# Patient Record
Sex: FEMALE | Race: WHITE | NOT HISPANIC OR LATINO | Employment: FULL TIME | URBAN - METROPOLITAN AREA
[De-identification: names, ages, dates, MRNs, and addresses within clinical notes are randomized per-mention and may not be internally consistent; named-entity substitution may affect disease eponyms.]

---

## 2024-08-26 ENCOUNTER — TRANSCRIBE ORDERS (OUTPATIENT)
Dept: LAB | Facility: HOSPITAL | Age: 19
End: 2024-08-26
Payer: COMMERCIAL

## 2024-08-26 ENCOUNTER — LAB (OUTPATIENT)
Dept: LAB | Facility: HOSPITAL | Age: 19
End: 2024-08-26
Payer: COMMERCIAL

## 2024-08-26 DIAGNOSIS — Z34.82 PRENATAL CARE, SUBSEQUENT PREGNANCY, SECOND TRIMESTER: ICD-10-CM

## 2024-08-26 DIAGNOSIS — Z34.82 PRENATAL CARE, SUBSEQUENT PREGNANCY, SECOND TRIMESTER: Primary | ICD-10-CM

## 2024-08-26 LAB
ABO GROUP BLD: NORMAL
AMPHET+METHAMPHET UR QL: NEGATIVE
AMPHETAMINES UR QL: NEGATIVE
BACTERIA UR QL AUTO: NORMAL /HPF
BARBITURATES UR QL SCN: NEGATIVE
BENZODIAZ UR QL SCN: NEGATIVE
BILIRUB UR QL STRIP: NEGATIVE
BILIRUB UR QL STRIP: NEGATIVE
BLD GP AB SCN SERPL QL: NEGATIVE
BUPRENORPHINE SERPL-MCNC: NEGATIVE NG/ML
CANNABINOIDS SERPL QL: NEGATIVE
CLARITY UR: CLEAR
CLARITY UR: CLEAR
COCAINE UR QL: NEGATIVE
COLOR UR: YELLOW
COLOR UR: YELLOW
DEPRECATED RDW RBC AUTO: 41 FL (ref 37–54)
ERYTHROCYTE [DISTWIDTH] IN BLOOD BY AUTOMATED COUNT: 13.7 % (ref 12.3–15.4)
FENTANYL UR-MCNC: NEGATIVE NG/ML
GLUCOSE UR STRIP-MCNC: NEGATIVE MG/DL
GLUCOSE UR STRIP-MCNC: NEGATIVE MG/DL
HBV SURFACE AG SERPL QL IA: NORMAL
HCT VFR BLD AUTO: 32.6 % (ref 34–46.6)
HCV AB SER QL: NORMAL
HGB BLD-MCNC: 10.3 G/DL (ref 12–15.9)
HGB UR QL STRIP.AUTO: NEGATIVE
HGB UR QL STRIP.AUTO: NEGATIVE
HIV 1+2 AB+HIV1 P24 AG SERPL QL IA: NORMAL
HOLD SPECIMEN: NORMAL
HYALINE CASTS UR QL AUTO: NORMAL /LPF
KETONES UR QL STRIP: ABNORMAL
KETONES UR QL STRIP: ABNORMAL
LEUKOCYTE ESTERASE UR QL STRIP.AUTO: NEGATIVE
LEUKOCYTE ESTERASE UR QL STRIP.AUTO: NEGATIVE
MCH RBC QN AUTO: 26.3 PG (ref 26.6–33)
MCHC RBC AUTO-ENTMCNC: 31.6 G/DL (ref 31.5–35.7)
MCV RBC AUTO: 83.4 FL (ref 79–97)
METHADONE UR QL SCN: NEGATIVE
NITRITE UR QL STRIP: NEGATIVE
NITRITE UR QL STRIP: NEGATIVE
OPIATES UR QL: NEGATIVE
OXYCODONE UR QL SCN: NEGATIVE
PCP UR QL SCN: NEGATIVE
PH UR STRIP.AUTO: 6.5 [PH] (ref 5–8)
PH UR STRIP.AUTO: 6.5 [PH] (ref 5–8)
PLATELET # BLD AUTO: 256 10*3/MM3 (ref 140–450)
PMV BLD AUTO: 11.2 FL (ref 6–12)
PROT UR QL STRIP: NEGATIVE
PROT UR QL STRIP: NEGATIVE
RBC # BLD AUTO: 3.91 10*6/MM3 (ref 3.77–5.28)
RBC # UR STRIP: NORMAL /HPF
REF LAB TEST METHOD: NORMAL
RH BLD: POSITIVE
SP GR UR STRIP: 1.01 (ref 1–1.03)
SP GR UR STRIP: 1.01 (ref 1–1.03)
SQUAMOUS #/AREA URNS HPF: NORMAL /HPF
TREPONEMA PALLIDUM IGG+IGM AB [PRESENCE] IN SERUM OR PLASMA BY IMMUNOASSAY: NORMAL
TRICYCLICS UR QL SCN: NEGATIVE
UROBILINOGEN UR QL STRIP: ABNORMAL
UROBILINOGEN UR QL STRIP: ABNORMAL
WBC # UR STRIP: NORMAL /HPF
WBC NRBC COR # BLD AUTO: 10.2 10*3/MM3 (ref 3.4–10.8)

## 2024-08-26 PROCEDURE — 85027 COMPLETE CBC AUTOMATED: CPT

## 2024-08-26 PROCEDURE — 80307 DRUG TEST PRSMV CHEM ANLYZR: CPT

## 2024-08-26 PROCEDURE — 87591 N.GONORRHOEAE DNA AMP PROB: CPT

## 2024-08-26 PROCEDURE — 86787 VARICELLA-ZOSTER ANTIBODY: CPT

## 2024-08-26 PROCEDURE — 87086 URINE CULTURE/COLONY COUNT: CPT

## 2024-08-26 PROCEDURE — 86901 BLOOD TYPING SEROLOGIC RH(D): CPT

## 2024-08-26 PROCEDURE — 87491 CHLMYD TRACH DNA AMP PROBE: CPT

## 2024-08-26 PROCEDURE — 87340 HEPATITIS B SURFACE AG IA: CPT

## 2024-08-26 PROCEDURE — 86762 RUBELLA ANTIBODY: CPT

## 2024-08-26 PROCEDURE — G0432 EIA HIV-1/HIV-2 SCREEN: HCPCS

## 2024-08-26 PROCEDURE — 36415 COLL VENOUS BLD VENIPUNCTURE: CPT

## 2024-08-26 PROCEDURE — 86900 BLOOD TYPING SEROLOGIC ABO: CPT

## 2024-08-26 PROCEDURE — 81001 URINALYSIS AUTO W/SCOPE: CPT

## 2024-08-26 PROCEDURE — 86850 RBC ANTIBODY SCREEN: CPT

## 2024-08-26 PROCEDURE — 86780 TREPONEMA PALLIDUM: CPT

## 2024-08-26 PROCEDURE — 86803 HEPATITIS C AB TEST: CPT

## 2024-08-27 LAB
BACTERIA SPEC AEROBE CULT: NO GROWTH
RUBV IGG SERPL IA-ACNC: 2.04 INDEX
VZV IGG SER IA-ACNC: <135 INDEX

## 2024-09-01 LAB
C TRACH RRNA SPEC QL NAA+PROBE: NEGATIVE
N GONORRHOEA RRNA SPEC QL NAA+PROBE: NEGATIVE

## 2024-11-18 ENCOUNTER — LAB (OUTPATIENT)
Dept: LAB | Facility: HOSPITAL | Age: 19
End: 2024-11-18
Payer: COMMERCIAL

## 2024-11-18 ENCOUNTER — TRANSCRIBE ORDERS (OUTPATIENT)
Dept: LAB | Facility: HOSPITAL | Age: 19
End: 2024-11-18
Payer: COMMERCIAL

## 2024-11-18 DIAGNOSIS — Z34.82 PRENATAL CARE, SUBSEQUENT PREGNANCY, SECOND TRIMESTER: Primary | ICD-10-CM

## 2024-11-18 DIAGNOSIS — Z34.82 PRENATAL CARE, SUBSEQUENT PREGNANCY, SECOND TRIMESTER: ICD-10-CM

## 2024-11-18 LAB
BLD GP AB SCN SERPL QL: NEGATIVE
DEPRECATED RDW RBC AUTO: 41.7 FL (ref 37–54)
ERYTHROCYTE [DISTWIDTH] IN BLOOD BY AUTOMATED COUNT: 13.9 % (ref 12.3–15.4)
GLUCOSE 1H P 100 G GLC PO SERPL-MCNC: 120 MG/DL (ref 65–139)
HCT VFR BLD AUTO: 29 % (ref 34–46.6)
HGB BLD-MCNC: 9.2 G/DL (ref 12–15.9)
MCH RBC QN AUTO: 26.6 PG (ref 26.6–33)
MCHC RBC AUTO-ENTMCNC: 31.7 G/DL (ref 31.5–35.7)
MCV RBC AUTO: 83.8 FL (ref 79–97)
PLATELET # BLD AUTO: 212 10*3/MM3 (ref 140–450)
PMV BLD AUTO: 11.3 FL (ref 6–12)
RBC # BLD AUTO: 3.46 10*6/MM3 (ref 3.77–5.28)
TREPONEMA PALLIDUM IGG+IGM AB [PRESENCE] IN SERUM OR PLASMA BY IMMUNOASSAY: NORMAL
WBC NRBC COR # BLD AUTO: 7.82 10*3/MM3 (ref 3.4–10.8)

## 2024-11-18 PROCEDURE — 36415 COLL VENOUS BLD VENIPUNCTURE: CPT

## 2024-11-18 PROCEDURE — 82948 REAGENT STRIP/BLOOD GLUCOSE: CPT | Performed by: STUDENT IN AN ORGANIZED HEALTH CARE EDUCATION/TRAINING PROGRAM

## 2024-11-18 PROCEDURE — 85027 COMPLETE CBC AUTOMATED: CPT

## 2024-11-18 PROCEDURE — 86850 RBC ANTIBODY SCREEN: CPT

## 2024-11-18 PROCEDURE — 82950 GLUCOSE TEST: CPT

## 2024-11-18 PROCEDURE — 86780 TREPONEMA PALLIDUM: CPT

## 2025-01-13 LAB — EXTERNAL GROUP B STREP ANTIGEN: NEGATIVE

## 2025-02-05 ENCOUNTER — HOSPITAL ENCOUNTER (OUTPATIENT)
Dept: LABOR AND DELIVERY | Facility: HOSPITAL | Age: 20
Discharge: HOME | End: 2025-02-05
Payer: COMMERCIAL

## 2025-02-05 ENCOUNTER — HOSPITAL ENCOUNTER (INPATIENT)
Facility: HOSPITAL | Age: 20
LOS: 3 days | Discharge: HOME | End: 2025-02-08
Attending: STUDENT IN AN ORGANIZED HEALTH CARE EDUCATION/TRAINING PROGRAM | Admitting: STUDENT IN AN ORGANIZED HEALTH CARE EDUCATION/TRAINING PROGRAM
Payer: COMMERCIAL

## 2025-02-05 ENCOUNTER — PREP FOR SURGERY (OUTPATIENT)
Dept: OTHER | Facility: HOSPITAL | Age: 20
End: 2025-02-05
Payer: COMMERCIAL

## 2025-02-05 DIAGNOSIS — Z3A.39 39 WEEKS GESTATION OF PREGNANCY: Primary | ICD-10-CM

## 2025-02-05 LAB
ABO GROUP BLD: NORMAL
BLD GP AB SCN SERPL QL: NEGATIVE
DEPRECATED RDW RBC AUTO: 46.6 FL (ref 37–54)
ERYTHROCYTE [DISTWIDTH] IN BLOOD BY AUTOMATED COUNT: 15.4 % (ref 12.3–15.4)
HCT VFR BLD AUTO: 38.2 % (ref 34–46.6)
HGB BLD-MCNC: 11.8 G/DL (ref 12–15.9)
MCH RBC QN AUTO: 25.9 PG (ref 26.6–33)
MCHC RBC AUTO-ENTMCNC: 30.9 G/DL (ref 31.5–35.7)
MCV RBC AUTO: 84 FL (ref 79–97)
PLATELET # BLD AUTO: 262 10*3/MM3 (ref 140–450)
PMV BLD AUTO: 11.7 FL (ref 6–12)
RBC # BLD AUTO: 4.55 10*6/MM3 (ref 3.77–5.28)
RH BLD: POSITIVE
T&S EXPIRATION DATE: NORMAL
TREPONEMA PALLIDUM IGG+IGM AB [PRESENCE] IN SERUM OR PLASMA BY IMMUNOASSAY: NORMAL
WBC NRBC COR # BLD AUTO: 11.51 10*3/MM3 (ref 3.4–10.8)

## 2025-02-05 PROCEDURE — 86900 BLOOD TYPING SEROLOGIC ABO: CPT | Performed by: STUDENT IN AN ORGANIZED HEALTH CARE EDUCATION/TRAINING PROGRAM

## 2025-02-05 PROCEDURE — 86901 BLOOD TYPING SEROLOGIC RH(D): CPT | Performed by: STUDENT IN AN ORGANIZED HEALTH CARE EDUCATION/TRAINING PROGRAM

## 2025-02-05 PROCEDURE — 25010000002 FENTANYL CITRATE (PF) 50 MCG/ML SOLUTION: Performed by: OBSTETRICS & GYNECOLOGY

## 2025-02-05 PROCEDURE — 85027 COMPLETE CBC AUTOMATED: CPT | Performed by: STUDENT IN AN ORGANIZED HEALTH CARE EDUCATION/TRAINING PROGRAM

## 2025-02-05 PROCEDURE — 86850 RBC ANTIBODY SCREEN: CPT | Performed by: STUDENT IN AN ORGANIZED HEALTH CARE EDUCATION/TRAINING PROGRAM

## 2025-02-05 PROCEDURE — 59200 INSERT CERVICAL DILATOR: CPT | Performed by: OBSTETRICS & GYNECOLOGY

## 2025-02-05 PROCEDURE — 86780 TREPONEMA PALLIDUM: CPT | Performed by: STUDENT IN AN ORGANIZED HEALTH CARE EDUCATION/TRAINING PROGRAM

## 2025-02-05 PROCEDURE — 25810000003 LACTATED RINGERS PER 1000 ML: Performed by: STUDENT IN AN ORGANIZED HEALTH CARE EDUCATION/TRAINING PROGRAM

## 2025-02-05 RX ORDER — TERBUTALINE SULFATE 1 MG/ML
0.25 INJECTION, SOLUTION SUBCUTANEOUS AS NEEDED
Status: DISCONTINUED | OUTPATIENT
Start: 2025-02-05 | End: 2025-02-06 | Stop reason: HOSPADM

## 2025-02-05 RX ORDER — TERBUTALINE SULFATE 1 MG/ML
0.25 INJECTION, SOLUTION SUBCUTANEOUS AS NEEDED
Status: CANCELLED | OUTPATIENT
Start: 2025-02-05

## 2025-02-05 RX ORDER — ONDANSETRON 4 MG/1
4 TABLET, ORALLY DISINTEGRATING ORAL EVERY 6 HOURS PRN
Status: CANCELLED | OUTPATIENT
Start: 2025-02-05

## 2025-02-05 RX ORDER — SODIUM CHLORIDE 0.9 % (FLUSH) 0.9 %
10 SYRINGE (ML) INJECTION AS NEEDED
Status: DISCONTINUED | OUTPATIENT
Start: 2025-02-05 | End: 2025-02-06 | Stop reason: HOSPADM

## 2025-02-05 RX ORDER — LIDOCAINE HYDROCHLORIDE 10 MG/ML
0.5 INJECTION, SOLUTION EPIDURAL; INFILTRATION; INTRACAUDAL; PERINEURAL ONCE AS NEEDED
Status: CANCELLED | OUTPATIENT
Start: 2025-02-05

## 2025-02-05 RX ORDER — BUTORPHANOL TARTRATE 1 MG/ML
1 INJECTION, SOLUTION INTRAMUSCULAR; INTRAVENOUS
Status: DISCONTINUED | OUTPATIENT
Start: 2025-02-05 | End: 2025-02-05

## 2025-02-05 RX ORDER — PROMETHAZINE HYDROCHLORIDE 12.5 MG/1
12.5 TABLET ORAL EVERY 6 HOURS PRN
Status: DISCONTINUED | OUTPATIENT
Start: 2025-02-05 | End: 2025-02-06 | Stop reason: HOSPADM

## 2025-02-05 RX ORDER — PRENATAL WITH FERROUS FUM AND FOLIC ACID 3080; 920; 120; 400; 22; 1.84; 3; 20; 10; 1; 12; 200; 27; 25; 2 [IU]/1; [IU]/1; MG/1; [IU]/1; MG/1; MG/1; MG/1; MG/1; MG/1; MG/1; UG/1; MG/1; MG/1; MG/1; MG/1
1 TABLET ORAL DAILY
COMMUNITY

## 2025-02-05 RX ORDER — ONDANSETRON 2 MG/ML
4 INJECTION INTRAMUSCULAR; INTRAVENOUS EVERY 6 HOURS PRN
Status: DISCONTINUED | OUTPATIENT
Start: 2025-02-05 | End: 2025-02-06 | Stop reason: SDUPTHER

## 2025-02-05 RX ORDER — MAGNESIUM CARB/ALUMINUM HYDROX 105-160MG
30 TABLET,CHEWABLE ORAL ONCE
Status: DISCONTINUED | OUTPATIENT
Start: 2025-02-05 | End: 2025-02-06 | Stop reason: HOSPADM

## 2025-02-05 RX ORDER — ONDANSETRON 4 MG/1
4 TABLET, ORALLY DISINTEGRATING ORAL EVERY 6 HOURS PRN
Status: DISCONTINUED | OUTPATIENT
Start: 2025-02-05 | End: 2025-02-06 | Stop reason: SDUPTHER

## 2025-02-05 RX ORDER — METHYLERGONOVINE MALEATE 0.2 MG/ML
200 INJECTION INTRAVENOUS ONCE AS NEEDED
Status: CANCELLED | OUTPATIENT
Start: 2025-02-05

## 2025-02-05 RX ORDER — OXYTOCIN/0.9 % SODIUM CHLORIDE 30/500 ML
30 PLASTIC BAG, INJECTION (ML) INTRAVENOUS ONCE
Status: CANCELLED | OUTPATIENT
Start: 2025-02-05 | End: 2025-02-05

## 2025-02-05 RX ORDER — OXYTOCIN/0.9 % SODIUM CHLORIDE 30/500 ML
30 PLASTIC BAG, INJECTION (ML) INTRAVENOUS CONTINUOUS
Status: CANCELLED | OUTPATIENT
Start: 2025-02-05 | End: 2025-02-05

## 2025-02-05 RX ORDER — BUTORPHANOL TARTRATE 2 MG/ML
2 INJECTION, SOLUTION INTRAMUSCULAR; INTRAVENOUS
Status: DISCONTINUED | OUTPATIENT
Start: 2025-02-05 | End: 2025-02-05

## 2025-02-05 RX ORDER — ACETAMINOPHEN 325 MG/1
650 TABLET ORAL EVERY 4 HOURS PRN
Status: CANCELLED | OUTPATIENT
Start: 2025-02-05

## 2025-02-05 RX ORDER — EPHEDRINE SULFATE 5 MG/ML
INJECTION INTRAVENOUS
Status: COMPLETED
Start: 2025-02-05 | End: 2025-02-06

## 2025-02-05 RX ORDER — FENTANYL CITRATE 50 UG/ML
50 INJECTION, SOLUTION INTRAMUSCULAR; INTRAVENOUS
Status: DISCONTINUED | OUTPATIENT
Start: 2025-02-05 | End: 2025-02-08 | Stop reason: HOSPADM

## 2025-02-05 RX ORDER — SODIUM CHLORIDE 0.9 % (FLUSH) 0.9 %
10 SYRINGE (ML) INJECTION EVERY 12 HOURS SCHEDULED
Status: DISCONTINUED | OUTPATIENT
Start: 2025-02-05 | End: 2025-02-06 | Stop reason: HOSPADM

## 2025-02-05 RX ORDER — SODIUM CHLORIDE 0.9 % (FLUSH) 0.9 %
10 SYRINGE (ML) INJECTION EVERY 12 HOURS SCHEDULED
Status: CANCELLED | OUTPATIENT
Start: 2025-02-05

## 2025-02-05 RX ORDER — SODIUM CHLORIDE 9 MG/ML
40 INJECTION, SOLUTION INTRAVENOUS AS NEEDED
Status: CANCELLED | OUTPATIENT
Start: 2025-02-05

## 2025-02-05 RX ORDER — ONDANSETRON 2 MG/ML
4 INJECTION INTRAMUSCULAR; INTRAVENOUS EVERY 6 HOURS PRN
Status: CANCELLED | OUTPATIENT
Start: 2025-02-05

## 2025-02-05 RX ORDER — SODIUM CHLORIDE, SODIUM LACTATE, POTASSIUM CHLORIDE, CALCIUM CHLORIDE 600; 310; 30; 20 MG/100ML; MG/100ML; MG/100ML; MG/100ML
125 INJECTION, SOLUTION INTRAVENOUS CONTINUOUS
Status: ACTIVE | OUTPATIENT
Start: 2025-02-05 | End: 2025-02-06

## 2025-02-05 RX ORDER — OXYTOCIN/0.9 % SODIUM CHLORIDE 30/500 ML
2 PLASTIC BAG, INJECTION (ML) INTRAVENOUS
Status: DISCONTINUED | OUTPATIENT
Start: 2025-02-05 | End: 2025-02-08 | Stop reason: HOSPADM

## 2025-02-05 RX ORDER — MAGNESIUM CARB/ALUMINUM HYDROX 105-160MG
30 TABLET,CHEWABLE ORAL ONCE
Status: CANCELLED | OUTPATIENT
Start: 2025-02-05 | End: 2025-02-05

## 2025-02-05 RX ORDER — SODIUM CHLORIDE, SODIUM LACTATE, POTASSIUM CHLORIDE, CALCIUM CHLORIDE 600; 310; 30; 20 MG/100ML; MG/100ML; MG/100ML; MG/100ML
125 INJECTION, SOLUTION INTRAVENOUS CONTINUOUS
Status: CANCELLED | OUTPATIENT
Start: 2025-02-05 | End: 2025-02-06

## 2025-02-05 RX ORDER — LIDOCAINE HYDROCHLORIDE 10 MG/ML
0.5 INJECTION, SOLUTION EPIDURAL; INFILTRATION; INTRACAUDAL; PERINEURAL ONCE AS NEEDED
Status: DISCONTINUED | OUTPATIENT
Start: 2025-02-05 | End: 2025-02-06 | Stop reason: HOSPADM

## 2025-02-05 RX ORDER — PROMETHAZINE HYDROCHLORIDE 12.5 MG/1
12.5 SUPPOSITORY RECTAL EVERY 6 HOURS PRN
Status: CANCELLED | OUTPATIENT
Start: 2025-02-05

## 2025-02-05 RX ORDER — BUTORPHANOL TARTRATE 1 MG/ML
1 INJECTION, SOLUTION INTRAMUSCULAR; INTRAVENOUS
Status: CANCELLED | OUTPATIENT
Start: 2025-02-05

## 2025-02-05 RX ORDER — SODIUM CHLORIDE 0.9 % (FLUSH) 0.9 %
10 SYRINGE (ML) INJECTION AS NEEDED
Status: CANCELLED | OUTPATIENT
Start: 2025-02-05

## 2025-02-05 RX ORDER — FENTANYL CITRATE 50 UG/ML
100 INJECTION, SOLUTION INTRAMUSCULAR; INTRAVENOUS
Status: DISCONTINUED | OUTPATIENT
Start: 2025-02-05 | End: 2025-02-08 | Stop reason: HOSPADM

## 2025-02-05 RX ORDER — MISOPROSTOL 200 UG/1
800 TABLET ORAL AS NEEDED
Status: CANCELLED | OUTPATIENT
Start: 2025-02-05

## 2025-02-05 RX ORDER — FERROUS SULFATE 324(65)MG
324 TABLET, DELAYED RELEASE (ENTERIC COATED) ORAL
COMMUNITY
End: 2025-02-08

## 2025-02-05 RX ORDER — BUTORPHANOL TARTRATE 2 MG/ML
2 INJECTION, SOLUTION INTRAMUSCULAR; INTRAVENOUS
Status: CANCELLED | OUTPATIENT
Start: 2025-02-05

## 2025-02-05 RX ORDER — PROMETHAZINE HYDROCHLORIDE 12.5 MG/1
12.5 SUPPOSITORY RECTAL EVERY 6 HOURS PRN
Status: DISCONTINUED | OUTPATIENT
Start: 2025-02-05 | End: 2025-02-06 | Stop reason: HOSPADM

## 2025-02-05 RX ORDER — SODIUM CHLORIDE 9 MG/ML
40 INJECTION, SOLUTION INTRAVENOUS AS NEEDED
Status: DISCONTINUED | OUTPATIENT
Start: 2025-02-05 | End: 2025-02-06 | Stop reason: HOSPADM

## 2025-02-05 RX ORDER — ACETAMINOPHEN 325 MG/1
650 TABLET ORAL EVERY 4 HOURS PRN
Status: DISCONTINUED | OUTPATIENT
Start: 2025-02-05 | End: 2025-02-06 | Stop reason: HOSPADM

## 2025-02-05 RX ORDER — CARBOPROST TROMETHAMINE 250 UG/ML
250 INJECTION, SOLUTION INTRAMUSCULAR AS NEEDED
Status: CANCELLED | OUTPATIENT
Start: 2025-02-05

## 2025-02-05 RX ORDER — PROMETHAZINE HYDROCHLORIDE 12.5 MG/1
12.5 TABLET ORAL EVERY 6 HOURS PRN
Status: CANCELLED | OUTPATIENT
Start: 2025-02-05

## 2025-02-05 RX ADMIN — FENTANYL CITRATE 50 MCG: 50 INJECTION, SOLUTION INTRAMUSCULAR; INTRAVENOUS at 20:12

## 2025-02-05 RX ADMIN — Medication 2 MILLI-UNITS/MIN: at 19:54

## 2025-02-05 RX ADMIN — SODIUM CHLORIDE, SODIUM LACTATE, POTASSIUM CHLORIDE, CALCIUM CHLORIDE 125 ML/HR: 20; 30; 600; 310 INJECTION, SOLUTION INTRAVENOUS at 19:18

## 2025-02-05 RX ADMIN — FENTANYL CITRATE 100 MCG: 50 INJECTION, SOLUTION INTRAMUSCULAR; INTRAVENOUS at 21:54

## 2025-02-05 NOTE — PROGRESS NOTES
19 y.o.  at 39w   OB History          3    Para        Term                AB   2    Living             SAB   1    IAB   1    Ectopic        Molar        Multiple        Live Births                 Presents as an induction of labor due to rigid cervix and elective induction  Her primary OB requests a Chung Bulb placement to initiate the induction of labor.    Fetal Heart Rate Assessment   Method:     Beats/min:     Baseline:     Varibility:     Accels:     Decels:     Tracing Category:       TOCO  SVE 1 cm / 50% -1 ultrasound confirmed vertex    A Chung Bulb was placed without difficulties with 60 mL of sterile saline.  The patient tolerated the procedure well.

## 2025-02-06 ENCOUNTER — ANESTHESIA (OUTPATIENT)
Dept: LABOR AND DELIVERY | Facility: HOSPITAL | Age: 20
End: 2025-02-06
Payer: COMMERCIAL

## 2025-02-06 ENCOUNTER — ANESTHESIA EVENT (OUTPATIENT)
Dept: LABOR AND DELIVERY | Facility: HOSPITAL | Age: 20
End: 2025-02-06
Payer: COMMERCIAL

## 2025-02-06 PROCEDURE — 63710000001 ONDANSETRON ODT 4 MG TABLET DISPERSIBLE: Performed by: STUDENT IN AN ORGANIZED HEALTH CARE EDUCATION/TRAINING PROGRAM

## 2025-02-06 PROCEDURE — 25010000002 FENTANYL CITRATE (PF) 50 MCG/ML SOLUTION: Performed by: OBSTETRICS & GYNECOLOGY

## 2025-02-06 PROCEDURE — 25010000002 ONDANSETRON PER 1 MG: Performed by: STUDENT IN AN ORGANIZED HEALTH CARE EDUCATION/TRAINING PROGRAM

## 2025-02-06 PROCEDURE — 25010000002 BUPIVACAINE (PF) 0.25 % SOLUTION: Performed by: ANESTHESIOLOGY

## 2025-02-06 PROCEDURE — 25010000002 FENTANYL CITRATE (PF) 50 MCG/ML SOLUTION: Performed by: ANESTHESIOLOGY

## 2025-02-06 PROCEDURE — 25010000002 LIDOCAINE-EPINEPHRINE (PF) 1.5 %-1:200000 SOLUTION: Performed by: ANESTHESIOLOGY

## 2025-02-06 PROCEDURE — 25810000003 LACTATED RINGERS PER 1000 ML: Performed by: STUDENT IN AN ORGANIZED HEALTH CARE EDUCATION/TRAINING PROGRAM

## 2025-02-06 PROCEDURE — C1755 CATHETER, INTRASPINAL: HCPCS | Performed by: ANESTHESIOLOGY

## 2025-02-06 PROCEDURE — C1755 CATHETER, INTRASPINAL: HCPCS

## 2025-02-06 PROCEDURE — 25010000002 ROPIVACAINE PER 1 MG: Performed by: ANESTHESIOLOGY

## 2025-02-06 PROCEDURE — 59025 FETAL NON-STRESS TEST: CPT

## 2025-02-06 PROCEDURE — 51702 INSERT TEMP BLADDER CATH: CPT

## 2025-02-06 RX ORDER — DOCUSATE SODIUM 100 MG/1
100 CAPSULE, LIQUID FILLED ORAL 2 TIMES DAILY
Status: DISCONTINUED | OUTPATIENT
Start: 2025-02-06 | End: 2025-02-08 | Stop reason: HOSPADM

## 2025-02-06 RX ORDER — CITRIC ACID/SODIUM CITRATE 334-500MG
30 SOLUTION, ORAL ORAL ONCE
Status: DISCONTINUED | OUTPATIENT
Start: 2025-02-06 | End: 2025-02-06 | Stop reason: HOSPADM

## 2025-02-06 RX ORDER — BUPIVACAINE HYDROCHLORIDE 2.5 MG/ML
INJECTION, SOLUTION EPIDURAL; INFILTRATION; INTRACAUDAL AS NEEDED
Status: DISCONTINUED | OUTPATIENT
Start: 2025-02-06 | End: 2025-02-06 | Stop reason: SURG

## 2025-02-06 RX ORDER — ACETAMINOPHEN 325 MG/1
650 TABLET ORAL EVERY 6 HOURS PRN
Status: DISCONTINUED | OUTPATIENT
Start: 2025-02-06 | End: 2025-02-08 | Stop reason: HOSPADM

## 2025-02-06 RX ORDER — CALCIUM CARBONATE 500 MG/1
1 TABLET, CHEWABLE ORAL 3 TIMES DAILY PRN
Status: DISCONTINUED | OUTPATIENT
Start: 2025-02-06 | End: 2025-02-08 | Stop reason: HOSPADM

## 2025-02-06 RX ORDER — ONDANSETRON 4 MG/1
4 TABLET, ORALLY DISINTEGRATING ORAL EVERY 8 HOURS PRN
Status: DISCONTINUED | OUTPATIENT
Start: 2025-02-06 | End: 2025-02-08 | Stop reason: HOSPADM

## 2025-02-06 RX ORDER — HYDROCODONE BITARTRATE AND ACETAMINOPHEN 10; 325 MG/1; MG/1
1 TABLET ORAL EVERY 4 HOURS PRN
Status: DISCONTINUED | OUTPATIENT
Start: 2025-02-06 | End: 2025-02-08 | Stop reason: HOSPADM

## 2025-02-06 RX ORDER — CARBOPROST TROMETHAMINE 250 UG/ML
250 INJECTION, SOLUTION INTRAMUSCULAR AS NEEDED
Status: DISCONTINUED | OUTPATIENT
Start: 2025-02-06 | End: 2025-02-06 | Stop reason: HOSPADM

## 2025-02-06 RX ORDER — HYDROCODONE BITARTRATE AND ACETAMINOPHEN 5; 325 MG/1; MG/1
1 TABLET ORAL EVERY 4 HOURS PRN
Status: DISCONTINUED | OUTPATIENT
Start: 2025-02-06 | End: 2025-02-08 | Stop reason: HOSPADM

## 2025-02-06 RX ORDER — FENTANYL CITRATE 50 UG/ML
INJECTION, SOLUTION INTRAMUSCULAR; INTRAVENOUS AS NEEDED
Status: DISCONTINUED | OUTPATIENT
Start: 2025-02-06 | End: 2025-02-06 | Stop reason: SURG

## 2025-02-06 RX ORDER — ONDANSETRON 4 MG/1
4 TABLET, ORALLY DISINTEGRATING ORAL EVERY 6 HOURS PRN
Status: DISCONTINUED | OUTPATIENT
Start: 2025-02-06 | End: 2025-02-06 | Stop reason: HOSPADM

## 2025-02-06 RX ORDER — HYDROCORTISONE 25 MG/G
1 CREAM TOPICAL AS NEEDED
Status: DISCONTINUED | OUTPATIENT
Start: 2025-02-06 | End: 2025-02-08 | Stop reason: HOSPADM

## 2025-02-06 RX ORDER — EPHEDRINE SULFATE 5 MG/ML
10 INJECTION INTRAVENOUS
Status: DISCONTINUED | OUTPATIENT
Start: 2025-02-06 | End: 2025-02-06 | Stop reason: HOSPADM

## 2025-02-06 RX ORDER — DIPHENHYDRAMINE HCL 25 MG
25 CAPSULE ORAL NIGHTLY PRN
Status: DISCONTINUED | OUTPATIENT
Start: 2025-02-06 | End: 2025-02-08 | Stop reason: HOSPADM

## 2025-02-06 RX ORDER — OXYTOCIN/0.9 % SODIUM CHLORIDE 30/500 ML
30 PLASTIC BAG, INJECTION (ML) INTRAVENOUS CONTINUOUS
Status: ACTIVE | OUTPATIENT
Start: 2025-02-06 | End: 2025-02-06

## 2025-02-06 RX ORDER — PRENATAL VIT/IRON FUM/FOLIC AC 27MG-0.8MG
1 TABLET ORAL DAILY
Status: DISCONTINUED | OUTPATIENT
Start: 2025-02-06 | End: 2025-02-08 | Stop reason: HOSPADM

## 2025-02-06 RX ORDER — METHYLERGONOVINE MALEATE 0.2 MG/ML
200 INJECTION INTRAVENOUS ONCE AS NEEDED
Status: DISCONTINUED | OUTPATIENT
Start: 2025-02-06 | End: 2025-02-06 | Stop reason: HOSPADM

## 2025-02-06 RX ORDER — SODIUM CHLORIDE 0.9 % (FLUSH) 0.9 %
1-10 SYRINGE (ML) INJECTION AS NEEDED
Status: DISCONTINUED | OUTPATIENT
Start: 2025-02-06 | End: 2025-02-08 | Stop reason: HOSPADM

## 2025-02-06 RX ORDER — FAMOTIDINE 10 MG/ML
20 INJECTION, SOLUTION INTRAVENOUS ONCE AS NEEDED
Status: DISCONTINUED | OUTPATIENT
Start: 2025-02-06 | End: 2025-02-06 | Stop reason: HOSPADM

## 2025-02-06 RX ORDER — ROPIVACAINE HYDROCHLORIDE 2 MG/ML
15 INJECTION, SOLUTION EPIDURAL; INFILTRATION; PERINEURAL CONTINUOUS
Status: DISCONTINUED | OUTPATIENT
Start: 2025-02-06 | End: 2025-02-08 | Stop reason: HOSPADM

## 2025-02-06 RX ORDER — LIDOCAINE HYDROCHLORIDE AND EPINEPHRINE 15; 5 MG/ML; UG/ML
INJECTION, SOLUTION EPIDURAL AS NEEDED
Status: DISCONTINUED | OUTPATIENT
Start: 2025-02-06 | End: 2025-02-06 | Stop reason: SURG

## 2025-02-06 RX ORDER — DIPHENHYDRAMINE HYDROCHLORIDE 50 MG/ML
12.5 INJECTION INTRAMUSCULAR; INTRAVENOUS EVERY 8 HOURS PRN
Status: DISCONTINUED | OUTPATIENT
Start: 2025-02-06 | End: 2025-02-06 | Stop reason: HOSPADM

## 2025-02-06 RX ORDER — METHYLERGONOVINE MALEATE 0.2 MG/ML
200 INJECTION INTRAVENOUS ONCE AS NEEDED
Status: DISCONTINUED | OUTPATIENT
Start: 2025-02-06 | End: 2025-02-08 | Stop reason: HOSPADM

## 2025-02-06 RX ORDER — IBUPROFEN 600 MG/1
600 TABLET, FILM COATED ORAL EVERY 6 HOURS PRN
Status: DISCONTINUED | OUTPATIENT
Start: 2025-02-06 | End: 2025-02-08 | Stop reason: HOSPADM

## 2025-02-06 RX ORDER — ONDANSETRON 2 MG/ML
4 INJECTION INTRAMUSCULAR; INTRAVENOUS EVERY 6 HOURS PRN
Status: DISCONTINUED | OUTPATIENT
Start: 2025-02-06 | End: 2025-02-08 | Stop reason: HOSPADM

## 2025-02-06 RX ORDER — ONDANSETRON 2 MG/ML
4 INJECTION INTRAMUSCULAR; INTRAVENOUS ONCE AS NEEDED
Status: DISCONTINUED | OUTPATIENT
Start: 2025-02-06 | End: 2025-02-06 | Stop reason: HOSPADM

## 2025-02-06 RX ORDER — MISOPROSTOL 200 UG/1
800 TABLET ORAL AS NEEDED
Status: DISCONTINUED | OUTPATIENT
Start: 2025-02-06 | End: 2025-02-06 | Stop reason: HOSPADM

## 2025-02-06 RX ORDER — CARBOPROST TROMETHAMINE 250 UG/ML
250 INJECTION, SOLUTION INTRAMUSCULAR AS NEEDED
Status: DISCONTINUED | OUTPATIENT
Start: 2025-02-06 | End: 2025-02-08 | Stop reason: HOSPADM

## 2025-02-06 RX ORDER — MISOPROSTOL 200 UG/1
800 TABLET ORAL AS NEEDED
Status: DISCONTINUED | OUTPATIENT
Start: 2025-02-06 | End: 2025-02-08 | Stop reason: HOSPADM

## 2025-02-06 RX ORDER — PROMETHAZINE HYDROCHLORIDE 12.5 MG/1
12.5 TABLET ORAL EVERY 4 HOURS PRN
Status: DISCONTINUED | OUTPATIENT
Start: 2025-02-06 | End: 2025-02-08 | Stop reason: HOSPADM

## 2025-02-06 RX ORDER — OXYTOCIN/0.9 % SODIUM CHLORIDE 30/500 ML
30 PLASTIC BAG, INJECTION (ML) INTRAVENOUS ONCE
Status: DISCONTINUED | OUTPATIENT
Start: 2025-02-06 | End: 2025-02-08 | Stop reason: HOSPADM

## 2025-02-06 RX ORDER — OXYTOCIN/0.9 % SODIUM CHLORIDE 30/500 ML
125 PLASTIC BAG, INJECTION (ML) INTRAVENOUS CONTINUOUS PRN
Status: DISCONTINUED | OUTPATIENT
Start: 2025-02-06 | End: 2025-02-08 | Stop reason: HOSPADM

## 2025-02-06 RX ORDER — SIMETHICONE 80 MG
80 TABLET,CHEWABLE ORAL 4 TIMES DAILY PRN
Status: DISCONTINUED | OUTPATIENT
Start: 2025-02-06 | End: 2025-02-08 | Stop reason: HOSPADM

## 2025-02-06 RX ORDER — ONDANSETRON 2 MG/ML
4 INJECTION INTRAMUSCULAR; INTRAVENOUS EVERY 6 HOURS PRN
Status: DISCONTINUED | OUTPATIENT
Start: 2025-02-06 | End: 2025-02-06 | Stop reason: HOSPADM

## 2025-02-06 RX ADMIN — ACETAMINOPHEN 650 MG: 325 TABLET ORAL at 10:41

## 2025-02-06 RX ADMIN — FENTANYL CITRATE 100 MCG: 50 INJECTION, SOLUTION INTRAMUSCULAR; INTRAVENOUS at 03:05

## 2025-02-06 RX ADMIN — ACETAMINOPHEN 650 MG: 325 TABLET ORAL at 16:34

## 2025-02-06 RX ADMIN — SODIUM CHLORIDE, SODIUM LACTATE, POTASSIUM CHLORIDE, CALCIUM CHLORIDE 125 ML/HR: 20; 30; 600; 310 INJECTION, SOLUTION INTRAVENOUS at 03:06

## 2025-02-06 RX ADMIN — PRENATAL VITAMINS-IRON FUMARATE 27 MG IRON-FOLIC ACID 0.8 MG TABLET 1 TABLET: at 12:04

## 2025-02-06 RX ADMIN — DOCUSATE SODIUM 100 MG: 100 CAPSULE, LIQUID FILLED ORAL at 20:31

## 2025-02-06 RX ADMIN — EPHEDRINE SULFATE 10 MG: 5 INJECTION INTRAVENOUS at 07:08

## 2025-02-06 RX ADMIN — SODIUM CHLORIDE, SODIUM LACTATE, POTASSIUM CHLORIDE, CALCIUM CHLORIDE 125 ML/HR: 20; 30; 600; 310 INJECTION, SOLUTION INTRAVENOUS at 04:59

## 2025-02-06 RX ADMIN — WITCH HAZEL: 500 SOLUTION RECTAL; TOPICAL at 12:04

## 2025-02-06 RX ADMIN — ONDANSETRON 4 MG: 2 INJECTION INTRAMUSCULAR; INTRAVENOUS at 00:11

## 2025-02-06 RX ADMIN — Medication: at 12:04

## 2025-02-06 RX ADMIN — FENTANYL CITRATE 100 MCG: 50 INJECTION, SOLUTION INTRAMUSCULAR; INTRAVENOUS at 04:36

## 2025-02-06 RX ADMIN — BUPIVACAINE HYDROCHLORIDE 8 ML: 2.5 INJECTION, SOLUTION EPIDURAL; INFILTRATION; INTRACAUDAL; PERINEURAL at 04:36

## 2025-02-06 RX ADMIN — DOCUSATE SODIUM 100 MG: 100 CAPSULE, LIQUID FILLED ORAL at 12:04

## 2025-02-06 RX ADMIN — LIDOCAINE HYDROCHLORIDE AND EPINEPHRINE 3 ML: 15; 5 INJECTION, SOLUTION EPIDURAL at 04:34

## 2025-02-06 RX ADMIN — FENTANYL CITRATE 100 MCG: 50 INJECTION, SOLUTION INTRAMUSCULAR; INTRAVENOUS at 00:11

## 2025-02-06 RX ADMIN — Medication 1 APPLICATION: at 12:04

## 2025-02-06 RX ADMIN — LIDOCAINE HYDROCHLORIDE AND EPINEPHRINE 2 ML: 15; 5 INJECTION, SOLUTION EPIDURAL at 04:35

## 2025-02-06 RX ADMIN — IBUPROFEN 600 MG: 600 TABLET, FILM COATED ORAL at 12:04

## 2025-02-06 RX ADMIN — ONDANSETRON 4 MG: 4 TABLET, ORALLY DISINTEGRATING ORAL at 20:35

## 2025-02-06 RX ADMIN — IBUPROFEN 600 MG: 600 TABLET, FILM COATED ORAL at 20:31

## 2025-02-06 RX ADMIN — ROPIVACAINE HYDROCHLORIDE 14 ML/HR: 2 INJECTION, SOLUTION EPIDURAL; INFILTRATION at 04:40

## 2025-02-06 NOTE — L&D DELIVERY NOTE
[facility]   Vaginal Delivery Note    Patient Name: Eve Paulino  : 2005  MRN: [id number]    Date of Delivery: 2025    Diagnosis     Pre & Post-Delivery:  Intrauterine pregnancy at 39w2d  Labor status: Induced Onset of Labor    39 weeks gestation of pregnancy             Problem List    Transfer to Postpartum     Review the Delivery Report for details.     Delivery     Delivery:      YOB: 2027   Time of Birth:  Gestational Age 9:16 AM  39w2d     Anesthesia: Epidural    Delivering clinician:     Forceps?   No   Vacuum? No    Shoulder dystocia present: No        Delivery narrative:  Patient admitted to L&D for IOL. Chung bulb placed for cervical ripening. Following removal IV Pitocin per protocol initiated. SROM occurred with return of clear fluid.Patient received epidural and was comfortable with contractions.    Labor progressed steadily. Patient felt increasingly more painful with contractions. Patient progressed to complete and felt the urge to push. Patient was placed in dorsal lithotomy position with feet in stirrups bilaterally. Patient was instructed on pushing in concert with contractions. Patient delivered infant vertex in NOLAN position. No nuchal cord was present. Anterior right shoulder delivered by gentle downward traction then followed by left posterior shoulder by gentle upward traction without complication. No shoulder dystocia. Body followed without difficulty. Male infant delivered pink, active, with good tone and was placed on maternal abdomen. Delayed cord clamping was performed for 1 minute while infant was stimulated and vigorous cry noted. Umbilical cord was doubly clamped and cut by father of the baby. Infant was attended to by labor and delivery and nursery nurse. Umbilical cord blood collected. Placenta delivered intact spontaneously without complication via gentle umbilical cord traction. IV Pitocin was started. Fundal massage was initiated and  "fundus was found to be firm. Blood and clots were cleared from the vaginal vault. The perineum, vaginal walls, cervix, and paraurethral area were inspected thoroughly. No lacerations observed. Hemostasis noted. No episiotomy was performed. Placenta was observed following delivery. Cotyledons all intact and membranes complete. Thick 3 vessel cord confirmed. No complications. Mother and baby were stable  when leaving delivery room.     I was present for all portions of delivery as listed above.         Infant     Findings: male infant     Infant observations: Weight: No birth weight on file.  Length:   in  Observations/Comments:        Apgars: 8  @ 1 minute /    9  @ 5 minutes   Infant Name: Curtis     Placenta & Cord         Placenta delivered  Spontaneous at   2/6/2025  9:20 AM    Cord:   present.   Nuchal Cord?  no   Cord blood obtained: Yes   Cord gases obtained:      Cord gas results: Venous:  No results found for: \"PHCVEN\", \"BECVEN\"    Arterial:  No results found for: \"PHCART\", \"BECART\"     Repair     Episiotomy: Not recorded    No    Lacerations: No   Estimated Blood Loss:  50mL     Quantitative Blood Loss:          Complications     none    Disposition     Mother to Mother Baby/Postpartum  in stable condition currently.  Baby to NBN  in stable condition currently.    Jeannine Betancourt DO  02/06/25  09:27 EST        "

## 2025-02-06 NOTE — ANESTHESIA PREPROCEDURE EVALUATION
Anesthesia Evaluation     Patient summary reviewed and Nursing notes reviewed                Airway   Mallampati: II  TM distance: >3 FB  Neck ROM: full  No difficulty expected  Dental      Pulmonary    (+) asthma,  Cardiovascular     (+) valvular problems/murmurs murmur      Neuro/Psych- negative ROS  GI/Hepatic/Renal/Endo - negative ROS     Musculoskeletal (-) negative ROS    Abdominal    Substance History - negative use     OB/GYN    (+) Pregnant        Other                    Anesthesia Plan    ASA 2     epidural       Anesthetic plan, risks, benefits, and alternatives have been provided, discussed and informed consent has been obtained with: patient.    Use of blood products discussed with patient .      CODE STATUS:    Level Of Support Discussed With: Patient  Code Status (Patient has no pulse and is not breathing): CPR (Attempt to Resuscitate)  Medical Interventions (Patient has pulse or is breathing): Full Support

## 2025-02-06 NOTE — H&P
[facility]  Obstetric History and Physical    Chief Complaint   Patient presents with    Scheduled Induction       Subjective     Patient is a 19 y.o. female  currently at 39w2d, who presents for induction of labor  for elective  with unfavorable cervix. On evaluation this am reports contractions, reports leakage of fluid, denies  vaginal bleeding. reports fetal movement. Reports that water ruptured after cruz balloon came out.     Her prenatal care is complicated by varicella nonimmune.  Her previous obstetric/gynecological history is noted for is non-contributory.    The following portions of the patients history were reviewed and updated as appropriate: current medications, allergies, past medical history, past surgical history, past family history, past social history, and problem list .       Prenatal Information:  Prenatal Results       Initial Prenatal Labs       Test Value Reference Range Date Time    Hemoglobin  10.3 g/dL 12.0 - 15.9 24 1051    Hematocrit  32.6 % 34.0 - 46.6 24 1051    Platelets  256 10*3/mm3 140 - 450 24 1051    Rubella IgG  2.04 index Immune >0.99 24 1051    Hepatitis B SAg  Non-Reactive  Non-Reactive 24 1051    Hepatitis C Ab  Non-Reactive  Non-Reactive 24 1051    RPR        T. Pallidum Ab   Non-Reactive  Non-Reactive 25 1745       Non-Reactive  Non-Reactive 24 1247       Non-Reactive  Non-Reactive 24 1051    ABO  A   25 1745    Rh  Positive   25 1745    Antibody Screen  Negative   24 1051    HIV  Non-Reactive  Non-Reactive 24 1051    Urine Culture  No growth   24 1051    Gonorrhea  Negative  Negative 24 1051    Chlamydia  Negative  Negative 24 1051    TSH        HgB A1c         Varicella IgG  <135 index Immune >165 24 1051    Hemoglobinopathy Fractionation        Hemoglobinopathy (genetic testing)        Cystic fibrosis         Spinal muscular atrophy        Fragile X                   Fetal testing        Test Value Reference Range Date Time    NIPT        MSAFP        AFP-4                  2nd and 3rd Trimester       Test Value Reference Range Date Time    Hemoglobin (repeated)  11.8 g/dL 12.0 - 15.9 02/05/25 1745       9.2 g/dL 12.0 - 15.9 11/18/24 1247    Hematocrit (repeated)  38.2 % 34.0 - 46.6 02/05/25 1745       29.0 % 34.0 - 46.6 11/18/24 1247    Platelets   262 10*3/mm3 140 - 450 02/05/25 1745       212 10*3/mm3 140 - 450 11/18/24 1247       256 10*3/mm3 140 - 450 08/26/24 1051    1 hour GTT   120 mg/dL 65 - 139 11/18/24 1247    Antibody Screen (repeated)  Negative   02/05/25 1745       Negative   11/18/24 1247    3rd TM syphilis scrn (repeated)  RPR         3rd TM syphilis scrn (repeated) TP-Ab  Non-Reactive  Non-Reactive 02/05/25 1745       Non-Reactive  Non-Reactive 11/18/24 1247    3rd TM syphilis screen TB-Ab (FTA)  Non-Reactive  Non-Reactive 02/05/25 1745       Non-Reactive  Non-Reactive 11/18/24 1247    Syphilis cascade test TP-Ab (EIA)        Syphilis cascade TPPA        GTT Fasting        GTT 1 Hr        GTT 2 Hr        GTT 3 Hr        Group B Strep ^ Negative   01/13/25               Other testing        Test Value Reference Range Date Time    Parvo IgG         CMV IgG                   Drug Screening       Test Value Reference Range Date Time    Amphetamine Screen  Negative  Negative 08/26/24 1051    Barbiturate Screen  Negative  Negative 08/26/24 1051    Benzodiazepine Screen  Negative  Negative 08/26/24 1051    Methadone Screen  Negative  Negative 08/26/24 1051    Phencyclidine Screen  Negative  Negative 08/26/24 1051    Opiates Screen  Negative  Negative 08/26/24 1051    THC Screen  Negative  Negative 08/26/24 1051    Cocaine Screen  Negative  Negative 08/26/24 1051    Propoxyphene Screen        Buprenorphine Screen  Negative  Negative 08/26/24 1051    Methamphetamine Screen  Negative  Negative 08/26/24 1051    Oxycodone Screen  Negative  Negative 08/26/24 1051     Tricyclic Antidepressants Screen  Negative  Negative 24 1051              Legend    ^: Historical                               Past OB History:       OB History    Para Term  AB Living   3 0 0 0 2 0   SAB IAB Ectopic Molar Multiple Live Births   1 1 0 0 0 0      # Outcome Date GA Lbr Christiano/2nd Weight Sex Type Anes PTL Lv   3 Current            2 SAB 2024           1 IAB 2023               Past Medical History: Past Medical History:   Diagnosis Date    Anemia     Asthma     Childhood- Unsure on last use of inhaler    Gonorrhea     Treated with Rocephin    Murmur     Childhood      Past Surgical History Past Surgical History:   Procedure Laterality Date    D & C WITH SUCTION      WISDOM TOOTH EXTRACTION        Family History: History reviewed. No pertinent family history.   Social History:  reports that she has never smoked. She has never been exposed to tobacco smoke. She has never used smokeless tobacco.   reports no history of alcohol use.   reports no history of drug use.        REVIEW OF SYSTEMS              Reports fetal movement is normal             Reports leakage of amniotic fluid.             Denies vaginal bleeding             She reports Regular contractions, frequency: Every 3 minutes, intensity strong             All other systems reviewed and are negative    Objective       Vital Signs Range for the last 24 hours  Temperature: Temp:  [98.2 °F (36.8 °C)-98.9 °F (37.2 °C)] 98.3 °F (36.8 °C)   Temp Source: Temp src: Oral   BP: BP: ()/(50-83) 110/65   Pulse: Heart Rate:  [] 116   Respirations: Resp:  [16-18] 16   SPO2: SpO2:  [98 %] 98 %   O2 Amount (l/min):     O2 Devices Device (Oxygen Therapy): room air   Weight: Weight:  [94.3 kg (208 lb)] 94.3 kg (208 lb)       Presentation: cephalic   Cervix: Exam by: Method: sterile vaginal exam performed   Dilation: Cervical Dilation (cm): 10   Effacement: Cervical Effacement: 100   Station: Fetal Station: +1        Fetal  Heart Rate Assessment   Method: Fetal HR Assessment Method: Telemetry/Wireless Fetal HR Monitor   Beats/min: Fetal HR (beats/min): 130   Baseline: Fetal HR Baseline: normal range   Varibility: Fetal HR Variability: moderate (amplitude range 6 to 25 bpm)   Accels: Fetal HR Accelerations: greater than/equal to 15 bpm, lasting at least 15 seconds   Decels: Fetal HR Decelerations: absent   Tracing Category:       Uterine Assessment   Method: Method: telemetry   Frequency (min): Contraction Frequency (Minutes): 1-3   Ctx Count in 10 min: Contractions in 10 Minutes: 0   Duration:     Intensity: Contraction Intensity: strong by palpation   Intensity by IUPC:     Resting Tone: Uterine Resting Tone: soft by palpation   Resting Tone by IUPC:     [place] Units:         Constitutional:  Well developed, well nourished, no acute distress, well-groomed.   Respiratory:  Lungs are clear to auscultation bilaterally, normal breath sounds.   Cardiovascular:  Normal rate and rhythm, no murmurs.   Gastrointestinal:  Soft, gravid, nontender.  Uterus: Soft, nontender. Fundus appropriate for dates.  Neurologic:  Alert & oriented x 3,  no focal deficits noted.   Psychiatric:  Speech and behavior appropriate.   Extremities: no cyanosis, clubbing or edema, no evidence of DVT.        Labs:  Lab Results   Component Value Date    WBC 11.51 (H) 02/05/2025    HGB 11.8 (L) 02/05/2025    HCT 38.2 02/05/2025    MCV 84.0 02/05/2025     02/05/2025     Results from last 7 days   Lab Units 02/05/25  1745   ABO TYPING  A   RH TYPING  Positive   ANTIBODY SCREEN  Negative           Assessment & Plan       39 weeks gestation of pregnancy        Assessment:   IUP at 39w2d weeks gestation with reactive fetal status.    2.   induction of labor  for elective  with unfavorable cervix  3.   Obstetrical history significant for is non-contributory.  4.   GBS status: Negative  5.   Rh: A+    Plan:  Chung bulb for cervical ripening  S/p removal and  SROM  Continuous FHT and TOCO monitoring.   SVE 10/100/+1 feeling pressure. Start pushing  Diet: NPO  Epidural in place for anesthesia  Plan of care has been reviewed with patient and questions answered.  Risks, benefits of treatment plan have been discussed.   Consent obtained to proceed with IOL        Jeannine Betancourt DO  2/6/2025  08:28 EST

## 2025-02-06 NOTE — LACTATION NOTE
25 1230   Maternal Information   Date of Referral 25   Person Making Referral lactation consultant  (new mother/baby couplet)   Maternal Reason for Referral no prior breastfeeding experience;other (see comments)  (Mom plans to exclusively pump and bottle feed.  She has requested DBM until her milk is in sufficiently.)   Infant Reason for Referral  infant   Maternal Infant Feeding   Maternal Emotional State receptive;relaxed;independent   Latch Assistance none needed  (Mom is exclusively bottle feeding EBM or DBM.)   Support Person Involvement actively supporting mother   Milk Expression/Equipment   Breast Pump Type double electric, personal;other (see comments)  (Mom said she has a home S1 pump that her mother is bringing this afternoon.  She said she also has a hands free pump.)   Breast Pump Flange Size   (Mom said she is using a 17 mm flange.)   Breast Pumping   Breast Pumping Interventions early pumping promoted;frequent pumping encouraged  (Mom was encouraged to pump every 3 hours for 15-20 minutes.)     Mom said she plans to pump and bottle feed only.  She has also requested DBM until her milk is in sufficiently.  She was provided education on exclusively pumping and the importance of washing her pump parts each time she pumps.  She was provided a basin and soap for washing her pump parts.  She was provided oral feeding syringes for feeding baby until her pumped volume is high enough to bottle feed. She was referred to the breast feeding section of her baby care booklet and was encouraged to call for assistance if needed.

## 2025-02-06 NOTE — ANESTHESIA PROCEDURE NOTES
Labor Epidural      Patient reassessed immediately prior to procedure    Patient location during procedure: OB  Performed By  Anesthesiologist: Samuel Denise DO  Preanesthetic Checklist  Completed: patient identified, IV checked, site marked, risks and benefits discussed, surgical consent, monitors and equipment checked, pre-op evaluation and timeout performed  Prep:  Pt Position:sitting  Sterile Tech:gloves, mask, sterile barrier and cap  Prep:chlorhexidine gluconate and isopropyl alcohol  Monitoring:blood pressure monitoring and continuous pulse oximetry  Epidural Block Procedure:  Approach:midline  Guidance:landmark technique and palpation technique  Location:L3-L4  Needle Type:Tuohy  Needle Gauge:17 G  Loss of Resistance Medium: air  Loss of Resistance: 6cm  Cath Depth at skin:12 cm  Paresthesia: none  Aspiration:negative  Test Dose:negative  Number of Attempts: 1  Post Assessment:  Dressing:secured with tape and occlusive dressing applied (Tegaderm Placed)  Pt Tolerance:patient tolerated the procedure well with no apparent complications  Complications:no

## 2025-02-07 LAB
BASOPHILS # BLD AUTO: 0.04 10*3/MM3 (ref 0–0.2)
BASOPHILS NFR BLD AUTO: 0.3 % (ref 0–1.5)
DEPRECATED RDW RBC AUTO: 48.8 FL (ref 37–54)
EOSINOPHIL # BLD AUTO: 0.24 10*3/MM3 (ref 0–0.4)
EOSINOPHIL NFR BLD AUTO: 1.9 % (ref 0.3–6.2)
ERYTHROCYTE [DISTWIDTH] IN BLOOD BY AUTOMATED COUNT: 15.4 % (ref 12.3–15.4)
HCT VFR BLD AUTO: 33.7 % (ref 34–46.6)
HGB BLD-MCNC: 10.3 G/DL (ref 12–15.9)
IMM GRANULOCYTES # BLD AUTO: 0.06 10*3/MM3 (ref 0–0.05)
IMM GRANULOCYTES NFR BLD AUTO: 0.5 % (ref 0–0.5)
LYMPHOCYTES # BLD AUTO: 2.57 10*3/MM3 (ref 0.7–3.1)
LYMPHOCYTES NFR BLD AUTO: 20.5 % (ref 19.6–45.3)
MCH RBC QN AUTO: 26.5 PG (ref 26.6–33)
MCHC RBC AUTO-ENTMCNC: 30.6 G/DL (ref 31.5–35.7)
MCV RBC AUTO: 86.6 FL (ref 79–97)
MONOCYTES # BLD AUTO: 0.82 10*3/MM3 (ref 0.1–0.9)
MONOCYTES NFR BLD AUTO: 6.5 % (ref 5–12)
NEUTROPHILS NFR BLD AUTO: 70.3 % (ref 42.7–76)
NEUTROPHILS NFR BLD AUTO: 8.83 10*3/MM3 (ref 1.7–7)
NRBC BLD AUTO-RTO: 0 /100 WBC (ref 0–0.2)
PLATELET # BLD AUTO: 195 10*3/MM3 (ref 140–450)
PMV BLD AUTO: 11.8 FL (ref 6–12)
RBC # BLD AUTO: 3.89 10*6/MM3 (ref 3.77–5.28)
WBC NRBC COR # BLD AUTO: 12.56 10*3/MM3 (ref 3.4–10.8)

## 2025-02-07 PROCEDURE — 63710000001 ONDANSETRON ODT 4 MG TABLET DISPERSIBLE: Performed by: STUDENT IN AN ORGANIZED HEALTH CARE EDUCATION/TRAINING PROGRAM

## 2025-02-07 PROCEDURE — 85025 COMPLETE CBC W/AUTO DIFF WBC: CPT | Performed by: STUDENT IN AN ORGANIZED HEALTH CARE EDUCATION/TRAINING PROGRAM

## 2025-02-07 RX ADMIN — ONDANSETRON 4 MG: 4 TABLET, ORALLY DISINTEGRATING ORAL at 03:54

## 2025-02-07 RX ADMIN — ACETAMINOPHEN 650 MG: 325 TABLET ORAL at 03:53

## 2025-02-07 RX ADMIN — IBUPROFEN 600 MG: 600 TABLET, FILM COATED ORAL at 08:16

## 2025-02-07 RX ADMIN — PRENATAL VITAMINS-IRON FUMARATE 27 MG IRON-FOLIC ACID 0.8 MG TABLET 1 TABLET: at 08:16

## 2025-02-07 RX ADMIN — DOCUSATE SODIUM 100 MG: 100 CAPSULE, LIQUID FILLED ORAL at 20:02

## 2025-02-07 RX ADMIN — SIMETHICONE 80 MG: 80 TABLET, CHEWABLE ORAL at 20:35

## 2025-02-07 RX ADMIN — IBUPROFEN 600 MG: 600 TABLET, FILM COATED ORAL at 20:02

## 2025-02-07 RX ADMIN — DOCUSATE SODIUM 100 MG: 100 CAPSULE, LIQUID FILLED ORAL at 08:16

## 2025-02-07 NOTE — LACTATION NOTE
Patient was encouraged to pump every 3 hours around the clock to help with the best milk supply.  Answered all questions and concerns.  Patient was dressing infant for infant photos.

## 2025-02-07 NOTE — ANESTHESIA POSTPROCEDURE EVALUATION
Patient: Eve Paulino    Procedure Summary       Date: 02/06/25 Room / Location:     Anesthesia Start: 0421 Anesthesia Stop: 0921    Procedure: LABOR ANALGESIA Diagnosis:     Scheduled Providers:  Provider: Samuel Denise DO    Anesthesia Type: epidural ASA Status: 2            Anesthesia Type: epidural    Vitals  Vitals Value Taken Time   /51 02/07/25 0700   Temp 97.9 °F (36.6 °C) 02/07/25 0700   Pulse 84 02/07/25 0700   Resp 16 02/07/25 0700   SpO2             Post Anesthesia Care and Evaluation    Patient location during evaluation: bedside  Patient participation: complete - patient participated  Level of consciousness: awake and alert  Pain management: adequate    Airway patency: patent  Anesthetic complications: No anesthetic complications    Cardiovascular status: acceptable  Respiratory status: acceptable  Hydration status: acceptable  Post Neuraxial Block status: Motor and sensory function returned to baseline and No signs or symptoms of PDPH     Spoke with Ms. Bonilla who requests verapamil.    verapamil (VERELAN) 180 MG C24P 30 capsule 6 8/31/2017     Sig: TAKE ONE CAPSULE BY MOUTH ONCE DAILY IN THE EVENING    E-Prescribing Status: Receipt confirmed by pharmacy (8/31/2017  3:07 PM CDT)      Called [facility] and confirmed they did receive Rx

## 2025-02-07 NOTE — PROGRESS NOTES
[facility]  Obstetric Progress Note    Chief Complaint: PPD #1    Subjective     Eve doing well. Pain is well controlled. Reports light lochia without passage of major clots. Ambulating. Tolerating PO intake. Voiding without difficulty or dysuria. Reports flatus.   Pumping. Male infant doing well. Defers circ. Reports good mood.   No further complaints at this time.      Objective     Vital Signs Range for the last 24 hours  Temp:  [97.9 °F (36.6 °C)-99 °F (37.2 °C)] 97.9 °F (36.6 °C)   BP: (0-133)/(0-70) 107/51   Heart Rate:  [] 84   Resp:  [16-18] 16                   Intake/Output last 24 hours:      Intake/Output Summary (Last 24 hours) at 2/7/2025 0846  Last data filed at 2/6/2025 0937  Gross per 24 hour   Intake --   Output 50 ml   Net -50 ml       Intake/Output this shift:    No intake/output data recorded.    Physical Exam:  General: A&Ox3. No acute distress.    HEENT Normocephalic, atraumatic    Heart RRR   Lungs CTAB, unlabored breathing   Abdomen Soft, non tender, negative for guarding and rebound   Extremities Exam of extremities: peripheral pulses normal, no pedal edema, no clubbing or cyanosis   Fundus Firm, midline, below umbilicus       Laboratory Results:  Lab Results   Component Value Date    WBC 11.51 (H) 02/05/2025    HGB 11.8 (L) 02/05/2025    HCT 38.2 02/05/2025    MCV 84.0 02/05/2025     02/05/2025    HEPBSAG Non-Reactive 08/26/2024         Assessment  PPD# 1 s/p vaginal delivery    Plan  Routine postpartum care.  VSS.  Serial lochia, fundal height checks appropriate. Continue serially.   PP Hgb ordered.  Pain well controled with tylenol and ibuprofen PRN.  Encourage PO hydration, ambulation, IS.  Male infant doing well.   Pumping. Lactation available for consult.     Dispo  Plan for discharge home tomorrow pending patient and infant status.        This note has been electronically signed.    Jeannine Betancourt DO  08:46 EST  February 7, 2025

## 2025-02-08 VITALS
BODY MASS INDEX: 34.66 KG/M2 | TEMPERATURE: 98.5 F | HEART RATE: 87 BPM | OXYGEN SATURATION: 98 % | RESPIRATION RATE: 16 BRPM | SYSTOLIC BLOOD PRESSURE: 111 MMHG | HEIGHT: 65 IN | WEIGHT: 208 LBS | DIASTOLIC BLOOD PRESSURE: 68 MMHG

## 2025-02-08 PROBLEM — Z3A.39 39 WEEKS GESTATION OF PREGNANCY: Status: RESOLVED | Noted: 2025-02-05 | Resolved: 2025-02-08

## 2025-02-08 RX ORDER — PSEUDOEPHEDRINE HCL 30 MG
100 TABLET ORAL 2 TIMES DAILY PRN
Qty: 60 CAPSULE | Refills: 1 | Status: SHIPPED | OUTPATIENT
Start: 2025-02-08

## 2025-02-08 RX ORDER — IBUPROFEN 600 MG/1
600 TABLET, FILM COATED ORAL EVERY 6 HOURS PRN
Qty: 60 TABLET | Refills: 1 | Status: SHIPPED | OUTPATIENT
Start: 2025-02-08

## 2025-02-08 RX ADMIN — IBUPROFEN 600 MG: 600 TABLET, FILM COATED ORAL at 08:19

## 2025-02-08 RX ADMIN — SIMETHICONE 80 MG: 80 TABLET, CHEWABLE ORAL at 08:19

## 2025-02-08 RX ADMIN — PRENATAL VITAMINS-IRON FUMARATE 27 MG IRON-FOLIC ACID 0.8 MG TABLET 1 TABLET: at 08:19

## 2025-02-08 RX ADMIN — DOCUSATE SODIUM 100 MG: 100 CAPSULE, LIQUID FILLED ORAL at 08:19

## 2025-02-08 NOTE — DISCHARGE SUMMARY
[facility]  Vaginal delivery discharge summary      Patient: Eve Paulino      MR#:[id number]  Admission  Diagnosis: Present on Admission:  **None**      Discharge Diagnosis: Active and Resolved Problems  Active Hospital Problems    Diagnosis  POA     (spontaneous vaginal delivery) [O80]  Not Applicable      Resolved Hospital Problems    Diagnosis Date Resolved POA    **39 weeks gestation of pregnancy [Z3A.39] 2025 Not Applicable            Date of Admission: 2025  Date of Discharge:  2025    Procedures:  Vaginal, Spontaneous    2025   9:16 AM     Service:  Obstetrics    Hospital Course:  Patient underwent vaginal delivery and remained in the hospital for 3 days.  During that time she remained afebrile and hemodynamically stable.  On the day of discharge, she was eating, ambulating and voiding without difficulty.  She is pumping.     Labs:    Lab Results   Component Value Date    WBC 12.56 (H) 2025    HGB 10.3 (L) 2025    HCT 33.7 (L) 2025    MCV 86.6 2025     2025     Results from last 7 days   Lab Units 25  1745   ABO TYPING  A   RH TYPING  Positive   ANTIBODY SCREEN  Negative            Discharge Medications        New Medications        Instructions Start Date   docusate sodium 100 MG capsule   100 mg, Oral, 2 Times Daily PRN      ibuprofen 600 MG tablet  Commonly known as: ADVIL,MOTRIN   600 mg, Oral, Every 6 Hours PRN             Continue These Medications        Instructions Start Date   Prenatal 27-1 27-1 MG tablet tablet   1 tablet, Daily             Stop These Medications      ferrous sulfate 324 (65 Fe) MG tablet delayed-release EC tablet              Discharge Disposition:  To Home    Discharge Condition:  Stable    Discharge Diet: Regular    Activity at Discharge: Pelvic rest    Follow-up Appointments  Follow up with [facility] in 6 weeks.     Parviz Arshad CNM  25  09:20 EST

## 2025-02-08 NOTE — LACTATION NOTE
Courtesy revisit with patient that is pumping her breast and providing expressed breastmilk in a bottle.  Patient is currently supplementing with formula until breastmilk supply increases.  Encouraged continuing to pump every 3 hours around the clock, including at night time for the best supply.   Patient stated that she pumped 3.5 mls at the last feeding and provided to infant.  Discussed formula and expressed breast milk should be given in 2 separate bottles.  All questions and concerns answered at this time.

## 2025-02-09 ENCOUNTER — MATERNAL SCREENING (OUTPATIENT)
Dept: CALL CENTER | Facility: HOSPITAL | Age: 20
End: 2025-02-09
Payer: COMMERCIAL

## 2025-02-09 NOTE — OUTREACH NOTE
Maternal Screening Survey      Flowsheet Row Responses   Eligibility Eligible   Prep survey completed? Yes   Facility patient discharged from? [facility] - Registered [facility]

## 2025-02-10 NOTE — PAYOR COMM NOTE
"Eve Paulino (19 y.o. Female)     From:Alana Sterling LPN, Utilization Review  Phone #[phone]  Fax #[phone]    [place] ID#[id number]     Has this been approved?  Form was faxed 2/6/25.    Discharged 2/8/25 with Baby.          Date of Birth   2005    Social Security Number       Address   [address] [place] [address]    Home Phone   [phone]    MRN   [id number]       Orthodoxy   None    Marital Status   Single                            Admission Date   2/5/25    Admission Type   Elective    Admitting Provider   Jeannine Betancourt DO    Attending Provider       Department, Room/Bed   [facility] 4B, N426/1       Discharge Date   2/8/2025    Discharge Disposition   Home or Self Care    Discharge Destination                                 Attending Provider: (none)   Allergies: No Known Allergies    Isolation: None   Infection: None   Code Status: Prior    Ht: 165.1 cm (65\")   Wt: 94.3 kg (208 lb)    Admission Cmt: None   Principal Problem: 39 weeks gestation of pregnancy [Z3A.39]                   Active Insurance as of 2/5/2025       Primary Coverage       Payor Plan Insurance Group Employer/Plan Group    [employer] BLUE [employer]OSS BLUE [employer] PPO [id number]       Payor Plan Address Payor Plan Phone Number Payor Plan Fax Number Effective Dates    [address] [phone]  5/1/2024 - None Entered    [facility] [address]         Subscriber Name Subscriber Birth Date Member ID       EVE PAULINO 2005 [id number]                     Emergency Contacts        (Rel.) Home Phone Work Phone Mobile Phone    Kashindi,Kiza (Significant Other) [phone] -- --    iggyhaile (Mother) -- -- [phone]              Insurance Information                  [employer]HEM BLUE CROSS/[employer] BLUE CROSS BLUE [employer] PPO Phone: [phone]    Subscriber: Eve Paulino Subscriber#: [id number]    Group#: " [id number] Precert#: --    Authorization#: -- Effective Date: --             History & Physical        Jeannine Betancourt DO at 25 0827          [place]  Obstetric History and Physical    Chief Complaint   Patient presents with    Scheduled Induction       Subjective    Patient is a 19 y.o. female  currently at 39w2d, who presents for induction of labor  for elective  with unfavorable cervix. On evaluation this am reports contractions, reports leakage of fluid, denies  vaginal bleeding. reports fetal movement. Reports that water ruptured after cruz balloon came out.     Her prenatal care is complicated by varicella nonimmune.  Her previous obstetric/gynecological history is noted for is non-contributory.    The following portions of the patients history were reviewed and updated as appropriate: current medications, allergies, past medical history, past surgical history, past family history, past social history, and problem list .       Prenatal Information:  Prenatal Results       Initial Prenatal Labs       Test Value Reference Range Date Time    Hemoglobin  10.3 g/dL 12.0 - 15.9 24 1051    Hematocrit  32.6 % 34.0 - 46.6 24 1051    Platelets  256 10*3/mm3 140 - 450 24 1051    Rubella IgG  2.04 index Immune >0.99 24 1051    Hepatitis B SAg  Non-Reactive  Non-Reactive 24 1051    Hepatitis C Ab  Non-Reactive  Non-Reactive 24 1051    RPR        T. Pallidum Ab   Non-Reactive  Non-Reactive 25 1745       Non-Reactive  Non-Reactive 24 1247       Non-Reactive  Non-Reactive 24 1051    ABO  A   25 1745    Rh  Positive   25 1745    Antibody Screen  Negative   24 1051    HIV  Non-Reactive  Non-Reactive 24 1051    Urine Culture  No growth   24 1051    Gonorrhea  Negative  Negative 24 1051    Chlamydia  Negative  Negative 24 1051    TSH        HgB A1c         Varicella IgG  <135 index Immune >165 24 1051     Hemoglobinopathy Fractionation        Hemoglobinopathy (genetic testing)        Cystic fibrosis         Spinal muscular atrophy        Fragile X                  Fetal testing        Test Value Reference Range Date Time    NIPT        MSAFP        AFP-4                  2nd and 3rd Trimester       Test Value Reference Range Date Time    Hemoglobin (repeated)  11.8 g/dL 12.0 - 15.9 02/05/25 1745       9.2 g/dL 12.0 - 15.9 11/18/24 1247    Hematocrit (repeated)  38.2 % 34.0 - 46.6 02/05/25 1745       29.0 % 34.0 - 46.6 11/18/24 1247    Platelets   262 10*3/mm3 140 - 450 02/05/25 1745       212 10*3/mm3 140 - 450 11/18/24 1247       256 10*3/mm3 140 - 450 08/26/24 1051    1 hour GTT   120 mg/dL 65 - 139 11/18/24 1247    Antibody Screen (repeated)  Negative   02/05/25 1745       Negative   11/18/24 1247    3rd TM syphilis scrn (repeated)  RPR         3rd TM syphilis scrn (repeated) TP-Ab  Non-Reactive  Non-Reactive 02/05/25 1745       Non-Reactive  Non-Reactive 11/18/24 1247    3rd TM syphilis screen TB-Ab (FTA)  Non-Reactive  Non-Reactive 02/05/25 1745       Non-Reactive  Non-Reactive 11/18/24 1247    Syphilis cascade test TP-Ab (EIA)        Syphilis cascade TPPA        GTT Fasting        GTT 1 Hr        GTT 2 Hr        GTT 3 Hr        Group B Strep ^ Negative   01/13/25               Other testing        Test Value Reference Range Date Time    Parvo IgG         CMV IgG                   Drug Screening       Test Value Reference Range Date Time    Amphetamine Screen  Negative  Negative 08/26/24 1051    Barbiturate Screen  Negative  Negative 08/26/24 1051    Benzodiazepine Screen  Negative  Negative 08/26/24 1051    Methadone Screen  Negative  Negative 08/26/24 1051    Phencyclidine Screen  Negative  Negative 08/26/24 1051    Opiates Screen  Negative  Negative 08/26/24 1051    THC Screen  Negative  Negative 08/26/24 1051    Cocaine Screen  Negative  Negative 08/26/24 1051    Propoxyphene Screen        Buprenorphine  Screen  Negative  Negative 24 1051    Methamphetamine Screen  Negative  Negative 24 1051    Oxycodone Screen  Negative  Negative 24 1051    Tricyclic Antidepressants Screen  Negative  Negative 24 1051              Legend    ^: Historical                               Past OB History:       OB History    Para Term  AB Living   3 0 0 0 2 0   SAB IAB Ectopic Molar Multiple Live Births   1 1 0 0 0 0      # Outcome Date GA Lbr Christiano/2nd Weight Sex Type Anes PTL Lv   3 Current            2 SAB 2024           1 IAB 2023               Past Medical History: Past Medical History:   Diagnosis Date    Anemia     Asthma     Childhood- Unsure on last use of inhaler    Gonorrhea     Treated with Rocephin    Murmur     Childhood      Past Surgical History Past Surgical History:   Procedure Laterality Date    D & C WITH SUCTION      WISDOM TOOTH EXTRACTION        Family History: History reviewed. No pertinent family history.   Social History:  reports that she has never smoked. She has never been exposed to tobacco smoke. She has never used smokeless tobacco.   reports no history of alcohol use.   reports no history of drug use.        REVIEW OF SYSTEMS              Reports fetal movement is normal             Reports leakage of amniotic fluid.             Denies vaginal bleeding             She reports Regular contractions, frequency: Every 3 minutes, intensity strong             All other systems reviewed and are negative    Objective      Vital Signs Range for the last 24 hours  Temperature: Temp:  [98.2 °F (36.8 °C)-98.9 °F (37.2 °C)] 98.3 °F (36.8 °C)   Temp Source: Temp src: Oral   BP: BP: ()/(50-83) 110/65   Pulse: Heart Rate:  [] 116   Respirations: Resp:  [16-18] 16   SPO2: SpO2:  [98 %] 98 %   O2 Amount (l/min):     O2 Devices Device (Oxygen Therapy): room air   Weight: Weight:  [94.3 kg (208 lb)] 94.3 kg (208 lb)       Presentation: cephalic   Cervix: Exam by:  Method: sterile vaginal exam performed   Dilation: Cervical Dilation (cm): 10   Effacement: Cervical Effacement: 100   Station: Fetal Station: +1        Fetal Heart Rate Assessment   Method: Fetal HR Assessment Method: Telemetry/Wireless Fetal HR Monitor   Beats/min: Fetal HR (beats/min): 130   Baseline: Fetal HR Baseline: normal range   Varibility: Fetal HR Variability: moderate (amplitude range 6 to 25 bpm)   Accels: Fetal HR Accelerations: greater than/equal to 15 bpm, lasting at least 15 seconds   Decels: Fetal HR Decelerations: absent   Tracing Category:       Uterine Assessment   Method: Method: telemetry   Frequency (min): Contraction Frequency (Minutes): 1-3   Ctx Count in 10 min: Contractions in 10 Minutes: 0   Duration:     Intensity: Contraction Intensity: strong by palpation   Intensity by IUPC:     Resting Tone: Uterine Resting Tone: soft by palpation   Resting Tone by IUPC:     Nadia Units:         Constitutional:  Well developed, well nourished, no acute distress, well-groomed.   Respiratory:  Lungs are clear to auscultation bilaterally, normal breath sounds.   Cardiovascular:  Normal rate and rhythm, no murmurs.   Gastrointestinal:  Soft, gravid, nontender.  Uterus: Soft, nontender. Fundus appropriate for dates.  Neurologic:  Alert & oriented x 3,  no focal deficits noted.   Psychiatric:  Speech and behavior appropriate.   Extremities: no cyanosis, clubbing or edema, no evidence of DVT.        Labs:  Lab Results   Component Value Date    WBC 11.51 (H) 02/05/2025    HGB 11.8 (L) 02/05/2025    HCT 38.2 02/05/2025    MCV 84.0 02/05/2025     02/05/2025     Results from last 7 days   Lab Units 02/05/25  1745   ABO TYPING  A   RH TYPING  Positive   ANTIBODY SCREEN  Negative           Assessment & Plan      39 weeks gestation of pregnancy        Assessment:   IUP at 39w2d weeks gestation with reactive fetal status.    2.   induction of labor  for elective  with unfavorable cervix  3.    Obstetrical history significant for is non-contributory.  4.   GBS status: Negative  5.   Rh: A+    Plan:  Chung bulb for cervical ripening  S/p removal and SROM  Continuous FHT and TOCO monitoring.   SVE 10/100/+1 feeling pressure. Start pushing  Diet: NPO  Epidural in place for anesthesia  Plan of care has been reviewed with patient and questions answered.  Risks, benefits of treatment plan have been discussed.   Consent obtained to proceed with IOL        Jeannine Betancourt DO  2025  08:28 EST    Electronically signed by Jeannine Betancourt DO at 25 0831       Facility-Administered Medications as of 2025   Medication Dose Route Frequency Provider Last Rate Last Admin    [] lactated ringers infusion  125 mL/hr Intravenous Continuous Jeannine Betancourt  mL/hr at 25 0459 125 mL/hr at 25 0459    [] oxytocin (PITOCIN) 30 units in 0.9% sodium chloride 500 mL (premix)  30 Units Intravenous Continuous Jeannine Betancourt DO         Orders (last 7 days)        Start     Ordered    25 0920  Discontinue IV  Once,   Status:  Canceled         25 0920    25 0919  Discharge patient  Once         25 0920    25 0000  docusate sodium 100 MG capsule  2 Times Daily PRN         25 0920    25 0000  ibuprofen (ADVIL,MOTRIN) 600 MG tablet  Every 6 Hours PRN         25 0920    25 0000  Discharge Follow-up with Specified Provider: Dr Betancourt; 6 Weeks         25 0920    25 0800  Sitz Bath  3 Times Daily,   Status:  Canceled        Comments: PRN    25 1136    25 0600  CBC & Differential  Morning Draw        Comments: Postpartum Day 1      25 1136    25 0600  CBC Auto Differential  PROCEDURE ONCE        Comments: Postpartum Day 1      25 2202    25 1400  Incentive Spirometry  Every 4 Hours While Awake,   Status:  Canceled       25 1136    25 1230  docusate sodium (COLACE) capsule 100 mg  2  Times Daily,   Status:  Discontinued         02/06/25 1136    02/06/25 1230  prenatal vitamin tablet 1 tablet  Daily,   Status:  Discontinued         02/06/25 1136    02/06/25 1137  Code Status and Medical Interventions: CPR (Attempt to Resuscitate); Full  Continuous,   Status:  Canceled         02/06/25 1136    02/06/25 1137  Vital Signs Per Hospital Policy  Per Hospital Policy/Protocol,   Status:  Canceled         02/06/25 1136    02/06/25 1137  Notify Physician  Until Discontinued,   Status:  Canceled         02/06/25 1136    02/06/25 1137  Up Ad Marli  Until Discontinued,   Status:  Canceled         02/06/25 1136    02/06/25 1137  Ambulate Patient  Every Shift,   Status:  Canceled       02/06/25 1136    02/06/25 1137  Diet: Regular/House; Fluid Consistency: Thin (IDDSI 0)  Diet Effective Now,   Status:  Canceled         02/06/25 1136    02/06/25 1137  Advance Diet As Tolerated -Regular  Until Discontinued,   Status:  Canceled         02/06/25 1136    02/06/25 1137  Fundal and Lochia Check  Per Order Details,   Status:  Canceled        Comments: Every 30 minutes x2, then Every Shift    02/06/25 1136    02/06/25 1137  RN to Assess Rh Status & Place RhIG Evaluation Order if Indicated  Continuous,   Status:  Canceled         02/06/25 1136    02/06/25 1137  Bladder Assessment  Per Order Details,   Status:  Canceled        Comments: Postpartum 1) Upon Admission to Unit & Every 4 Hours PRN Until Voiding. 2) Out of Bed to Void in 8 Hours.    02/06/25 1136    02/06/25 1137  Straight Cath  Per Order Details,   Status:  Canceled        Comments: Postpartum: If Distended & Unable to Void, May Repeat Once.    02/06/25 1136    02/06/25 1137  Indwelling Urinary Catheter  Per Order Details,   Status:  Canceled        Comments: Postpartum : After Straight Cathed x2 or if Greater Than 1000mL Residual, Insert Indwelling Urinary Catheter Until Further MD Order.    02/06/25 1136    02/06/25 1137  Apply Ice to Perineum  Per Order  Details,   Status:  Canceled        Comments: For 20 Minutes Every 2 Hours    02/06/25 1136    02/06/25 1137  Waffle Cushion  Per Order Details,   Status:  Canceled        Comments: For Perineal Discomfort    02/06/25 1136    02/06/25 1137  Donut Ring  Per Order Details,   Status:  Canceled        Comments: For Perineal Pain    02/06/25 1136    02/06/25 1137  Kpad  Per Order Details,   Status:  Canceled        Comments: For Pain    02/06/25 1136    02/06/25 1137  Breast pump to bed  Once,   Status:  Canceled         02/06/25 1136    02/06/25 1137  If indicated -- Please administer RH Immunoglobulin based on results of cord blood evaluation and fetal screen lab tests, pharmacy to dispense  Per Order Details,   Status:  Canceled        Comments: See Process Instructions For Reference Range Details.    02/06/25 1136    02/06/25 1137  Place Sequential Compression Device  Once,   Status:  Canceled         02/06/25 1136    02/06/25 1137  Maintain Sequential Compression Device  Continuous,   Status:  Canceled         02/06/25 1136    02/06/25 1136  Warm compress  As Needed,   Status:  Canceled       02/06/25 1136    02/06/25 1136  Apply ace wrap, tight bra, or binder  As Needed,   Status:  Canceled       02/06/25 1136    02/06/25 1136  Apply ice packs  As Needed,   Status:  Canceled       02/06/25 1136    02/06/25 1136  sodium chloride 0.9 % flush 1-10 mL  As Needed,   Status:  Discontinued         02/06/25 1136    02/06/25 1136  oxytocin (PITOCIN) 30 units in 0.9% sodium chloride 500 mL (premix)  Continuous PRN,   Status:  Discontinued         02/06/25 1136    02/06/25 1136  ibuprofen (ADVIL,MOTRIN) tablet 600 mg  Every 6 Hours PRN,   Status:  Discontinued         02/06/25 1136    02/06/25 1136  acetaminophen (TYLENOL) tablet 650 mg  Every 6 Hours PRN,   Status:  Discontinued         02/06/25 1136    02/06/25 1136  HYDROcodone-acetaminophen (NORCO) 5-325 MG per tablet 1 tablet  Every 4 Hours PRN,   Status:  Discontinued    "     Placed in \"Or\" Linked Group    02/06/25 1136    02/06/25 1136  HYDROcodone-acetaminophen (NORCO)  MG per tablet 1 tablet  Every 4 Hours PRN,   Status:  Discontinued        Placed in \"Or\" Linked Group    02/06/25 1136    02/06/25 1136  carboprost (HEMABATE) injection 250 mcg  As Needed,   Status:  Discontinued         02/06/25 1136    02/06/25 1136  miSOPROStol (CYTOTEC) tablet 800 mcg  As Needed,   Status:  Discontinued         02/06/25 1136    02/06/25 1136  methylergonovine (METHERGINE) injection 200 mcg  Once As Needed,   Status:  Discontinued         02/06/25 1136    02/06/25 1136  diphenhydrAMINE (BENADRYL) capsule 25 mg  Nightly PRN,   Status:  Discontinued         02/06/25 1136    02/06/25 1136  magnesium hydroxide (MILK OF MAGNESIA) 400 MG/5ML suspension 30 mL  Daily PRN,   Status:  Discontinued         02/06/25 1136    02/06/25 1136  lanolin topical 1 Application  Every 1 Hour PRN,   Status:  Discontinued         02/06/25 1136    02/06/25 1136  benzocaine-menthol (DERMOPLAST) 20-0.5 % topical spray  As Needed,   Status:  Discontinued         02/06/25 1136    02/06/25 1136  witch hazel-glycerin (TUCKS) pad  As Needed,   Status:  Discontinued         02/06/25 1136    02/06/25 1136  Hydrocortisone (Perianal) (ANUSOL-HC) 2.5 % rectal cream 1 Application  As Needed,   Status:  Discontinued         02/06/25 1136    02/06/25 1136  benzocaine (AMERICAINE) 20 % rectal ointment 1 Application  As Needed,   Status:  Discontinued         02/06/25 1136    02/06/25 1136  ondansetron ODT (ZOFRAN-ODT) disintegrating tablet 4 mg  Every 8 Hours PRN,   Status:  Discontinued         02/06/25 1136    02/06/25 1136  ondansetron (ZOFRAN) injection 4 mg  Every 6 Hours PRN,   Status:  Discontinued         02/06/25 1136    02/06/25 1136  promethazine (PHENERGAN) tablet 12.5 mg  Every 4 Hours PRN,   Status:  Discontinued         02/06/25 1136    02/06/25 1136  simethicone (MYLICON) chewable tablet 80 mg  4 Times Daily PRN,  " " Status:  Discontinued         02/06/25 1136    02/06/25 1136  calcium carbonate (TUMS) chewable tablet 500 mg (200 mg elemental)  3 Times Daily PRN,   Status:  Discontinued         02/06/25 1136    02/06/25 1100  oxytocin (PITOCIN) 30 units in 0.9% sodium chloride 500 mL (premix)  Continuous        Placed in \"Followed by\" Linked Group    02/06/25 0953    02/06/25 1045  oxytocin (PITOCIN) 30 units in 0.9% sodium chloride 500 mL (premix)  Once,   Status:  Discontinued        Placed in \"Followed by\" Linked Group    02/06/25 0953    02/06/25 0954  Nurse may remove epidural catheter after delivery.  Until Discontinued,   Status:  Canceled         02/06/25 0953    02/06/25 0954  Transfer to postpartum when discharge criteria met.  Until Discontinued,   Status:  Canceled         02/06/25 0953    02/06/25 0954  Notify Physician (specified)  Until Discontinued,   Status:  Canceled         02/06/25 0953    02/06/25 0954  Vital Signs Per hospital policy  Per Hospital Policy/Protocol,   Status:  Canceled         02/06/25 0953    02/06/25 0954  Fundal & Lochia Check  Per Order Details,   Status:  Canceled        Comments: Every 15 Minutes x4, Then Every 30 Minutes x2, Then Every Shift    02/06/25 0953    02/06/25 0954  Diet: Regular/House; Fluid Consistency: Thin (IDDSI 0)  Diet Effective Now,   Status:  Canceled         02/06/25 0953    02/06/25 0954  Blood Gas, Arterial, Cord  Once,   Status:  Canceled        Comments: If requested by provider at the time of delivery      02/06/25 0953    02/06/25 0953  Up with Assistance  As Needed,   Status:  Canceled       02/06/25 0953    02/06/25 0953  Fundal & Lochia Check  Every Shift,   Status:  Canceled       02/06/25 0953    02/06/25 0953  Apply Ice to Perineum  As Needed,   Status:  Canceled       02/06/25 0953    02/06/25 0953  Bladder Assessment  As Needed,   Status:  Canceled       02/06/25 0953    02/06/25 0953  methylergonovine (METHERGINE) injection 200 mcg  Once As Needed,   " "Status:  Discontinued         02/06/25 0953    02/06/25 0953  carboprost (HEMABATE) injection 250 mcg  As Needed,   Status:  Discontinued         02/06/25 0953 02/06/25 0953  miSOPROStol (CYTOTEC) tablet 800 mcg  As Needed,   Status:  Discontinued         02/06/25 0953 02/06/25 0953  ondansetron ODT (ZOFRAN-ODT) disintegrating tablet 4 mg  Every 6 Hours PRN,   Status:  Discontinued        Placed in \"Or\" Linked Group    02/06/25 0953    02/06/25 0953  ondansetron (ZOFRAN) injection 4 mg  Every 6 Hours PRN,   Status:  Discontinued        Placed in \"Or\" Linked Group    02/06/25 0953    02/06/25 0927  Transfer Patient  Once         02/06/25 0927 02/06/25 0500  ropivacaine (NAROPIN) 0.2 % injection  Continuous,   Status:  Discontinued         02/06/25 0414 02/06/25 0500  Sod Citrate-Citric Acid (BICITRA) oral solution 30 mL  Once,   Status:  Discontinued         02/06/25 0414 02/06/25 0415  Vital Signs Per Anesthesia Guidelines  Per Order Details,   Status:  Canceled        Comments: Every 3 Minutes x20 Minutes Following Epidural Dosing, Then Every 15 Minutes If Stable    02/06/25 0414 02/06/25 0415  Start IV with #16 or #18 gauge angiocath.  Once,   Status:  Canceled         02/06/25 0414 02/06/25 0415  Nurse or anesthesiologist to remain with patient for 15 minutes following dosing.  Until Discontinued,   Status:  Canceled         02/06/25 0414 02/06/25 0415  Facilitate maternal postion on side and maintain uterine displacement.  Until Discontinued,   Status:  Canceled         02/06/25 0414 02/06/25 0415  Consult anesthesia services prior to changing epidural infusion/rate.  Until Discontinued,   Status:  Canceled         02/06/25 0414 02/06/25 0414  lactated ringers bolus 1,000 mL  As Needed,   Status:  Discontinued         02/06/25 0414 02/06/25 0414  ePHEDrine Sulfate (Pressors) 5 MG/ML injection 10 mg  Every 10 Minutes PRN,   Status:  Discontinued         02/06/25 0414 02/06/25 " 0414  ondansetron (ZOFRAN) injection 4 mg  Once As Needed,   Status:  Discontinued         02/06/25 0414    02/06/25 0414  famotidine (PEPCID) injection 20 mg  Once As Needed,   Status:  Discontinued         02/06/25 0414    02/06/25 0414  diphenhydrAMINE (BENADRYL) injection 12.5 mg  Every 8 Hours PRN,   Status:  Discontinued         02/06/25 0414    02/05/25 2100  sodium chloride 0.9 % flush 10 mL  Every 12 Hours Scheduled,   Status:  Discontinued         02/05/25 1732 02/05/25 2030  oxytocin (PITOCIN) 30 units in 0.9% sodium chloride 500 mL (premix)  Titrated,   Status:  Discontinued         02/05/25 1934 02/05/25 2000  Vital Signs q 4 while awake  Every 4 Hours,   Status:  Canceled      Comments: While the patient is awake.    02/05/25 1732 02/05/25 1956  fentaNYL citrate (PF) (SUBLIMAZE) injection 100 mcg  Every 1 Hour PRN,   Status:  Discontinued         02/05/25 1956 02/05/25 1956  fentaNYL citrate (PF) (SUBLIMAZE) injection 50 mcg  Every 1 Hour PRN,   Status:  Discontinued         02/05/25 1956 02/05/25 1830  lactated ringers bolus 1,000 mL  Once,   Status:  Discontinued         02/05/25 1732 02/05/25 1830  lactated ringers infusion  Continuous         02/05/25 1732 02/05/25 1830  mineral oil liquid 30 mL  Once,   Status:  Discontinued         02/05/25 1732 02/05/25 1733  Admit To Obstetrics Inpatient  Once         02/05/25 1732 02/05/25 1733  Code Status and Medical Interventions: CPR (Attempt to Resuscitate); Full Support  Continuous,   Status:  Canceled         02/05/25 1732 02/05/25 1733  Obtain informed consent  Once,   Status:  Canceled         02/05/25 1732 02/05/25 1733  Vital Signs Per hospital policy  Per Hospital Policy/Protocol,   Status:  Canceled         02/05/25 1732 02/05/25 1733  Continuous Fetal Monitoring With NST on Admission and Prior to Initiation of Oxytocin.  Per Order Details,   Status:  Canceled        Comments: Continuous Fetal Monitoring With  NST on Admission & Prior to Initiation of Oxytocin.    25  External Uterine Contraction Monitoring  Per Hospital Policy/Protocol,   Status:  Canceled         25 1732    25  Notify Physician (specified)  Until Discontinued,   Status:  Canceled         25 1732    25  Notify physician for tachysystole (per hospital algorithm)  Until Discontinued,   Status:  Canceled         25 1732    25 1733  Notify physician if membranes ruptured, bleeding greater than 1 pad an hour, fetal heart tone abnormality, and severe pain  Until Discontinued,   Status:  Canceled         25 1732    25 1733  Up Ad Marli  Until Discontinued,   Status:  Canceled         25  Initiate Group Beta Strep (GBS) Prophylaxis Protocol, If Criteria Met  Continuous,   Status:  Canceled        Comments: NO TREATMENT RECOMMENDED IF: 1)  Maternal GBS status known negative 2)  Scheduled  birth with intact membranes, not in labor.  3 ) Maternal GBS unknown, no risk factors.   TREAT WITH ANTIBIOTICS IF:  1)  Maternal GBS status is known postive.  2)  Maternal GBS status unknown with these risk factors:  a)  Previous infant affected by GBS infection.  b)  GBS urinary tract infection (UTI) or bacteruria during pregnancy  c)  Unexplained maternal fever in labor (greater than or equal to 100.4F or 38.0C)  d)  Prolonged rupture of the membranes greater than or equal to 18 hours.  e)  Gestational age less than 37 weeks.    23  Assess Need for Indwelling Urinary Catheter - Follow Removal Protocol  Continuous,   Status:  Canceled        Comments: Indwelling Urinary Catheter Removal Criteria  Discontinue Indwelling Urinary Catheter Unless One of the Following is Present:  Urinary Retention or Obstruction  Chronic Urinary Catheter Use  End of Life  Critical Illness with Strict I/O   Tract or Abdominal Surgery  Stage 3/4 Sacral  "/ Perineal Wound  Required Activity Restriction: Trauma  Required Activity Restriction: Spine Surgery  If Patient is Being Followed by Urology Contact Them PRIOR to Removal  Do Not Remove Indwelling Urinary Catheter Order is Present with a CLINICAL REASON to Maintain the Catheter. Provider is Required to Include a Clinical Reason to Maintain a Urinary Catheter    Patient Admitted With Indwelling Urinary Catheter (Not Placed at [facility], [place])  Assess for Continued Need & Document Medical Necessity  If Infection is Suspected, Contact the Provider       Placed in \"And\" Linked Group    02/05/25 1732    02/05/25 1733  Urinary Catheter Care  Every Shift,   Status:  Canceled      Placed in \"And\" Linked Group    02/05/25 1732    02/05/25 1733  Chung Bulb Cervical Ripening w/o infusion  Once,   Status:  Canceled        Comments: Have Laborist Place Cervical Chung Without Infusion.    Once FB is placed: Start Low Dose Pitocin Drip Overnight, Then Increase Per Protocol at 0500 Next Morning    02/05/25 1732    02/05/25 1733  NPO Diet NPO Type: Ice Chips  Diet Effective Now,   Status:  Canceled         02/05/25 1732    02/05/25 1733  CBC (No Diff)  Once         02/05/25 1732    02/05/25 1733  Treponema pallidum AB w/Reflex RPR  Once         02/05/25 1732    02/05/25 1733  Type & Screen  Once         02/05/25 1732    02/05/25 1733  Insert Peripheral IV  Once,   Status:  Canceled         02/05/25 1732    02/05/25 1733  Saline Lock & Maintain IV Access  Continuous,   Status:  Canceled         02/05/25 1732    02/05/25 1732  Position change  As Needed,   Status:  Canceled      Comments: For intra-uterine resuscitation for hypertonus, hypertstimulation, or non-reassuring fetal status    02/05/25 1732    02/05/25 1732  Insert Indwelling Urinary Catheter  As Needed,   Status:  Canceled        Comments: EPIDURAL PLACEMENT   Placed in \"And\" Linked Group    02/05/25 1732    02/05/25 1732  sodium chloride 0.9 % flush 10 mL  As Needed,   " "Status:  Discontinued         02/05/25 1732    02/05/25 1732  sodium chloride 0.9 % infusion 40 mL  As Needed,   Status:  Discontinued         02/05/25 1732    02/05/25 1732  lidocaine PF 1% (XYLOCAINE) injection 0.5 mL  Once As Needed,   Status:  Discontinued         02/05/25 1732    02/05/25 1732  acetaminophen (TYLENOL) tablet 650 mg  Every 4 Hours PRN,   Status:  Discontinued         02/05/25 1732    02/05/25 1732  butorphanol (STADOL) injection 1 mg  Every 2 Hours PRN,   Status:  Discontinued         02/05/25 1732    02/05/25 1732  butorphanol (STADOL) injection 2 mg  Every 2 Hours PRN,   Status:  Discontinued         02/05/25 1732    02/05/25 1732  ondansetron ODT (ZOFRAN-ODT) disintegrating tablet 4 mg  Every 6 Hours PRN,   Status:  Discontinued        Placed in \"Or\" Linked Group    02/05/25 1732    02/05/25 1732  ondansetron (ZOFRAN) injection 4 mg  Every 6 Hours PRN,   Status:  Discontinued        Placed in \"Or\" Linked Group    02/05/25 1732    02/05/25 1732  promethazine (PHENERGAN) suppository 12.5 mg  Every 6 Hours PRN,   Status:  Discontinued        Placed in \"Or\" Linked Group    02/05/25 1732    02/05/25 1732  promethazine (PHENERGAN) tablet 12.5 mg  Every 6 Hours PRN,   Status:  Discontinued        Placed in \"Or\" Linked Group    02/05/25 1732    02/05/25 1732  terbutaline (BRETHINE) injection 0.25 mg  As Needed,   Status:  Discontinued         02/05/25 1732    02/05/25 0000  Group B Streptococcus Culture - Swab, Vaginal/Rectum        Comments: This is an external result entered through the Results Console.      02/05/25 1903    --  Prenatal Vit-Fe Fumarate-FA (Prenatal 27-1) 27-1 MG tablet tablet  Daily         02/05/25 1800    --  ferrous sulfate 324 (65 Fe) MG tablet delayed-release EC tablet  Daily With Breakfast,   Status:  Discontinued         02/05/25 1800                     Operative/Procedure Notes (last 7 days)        Jeannine Betancourt DO at 02/06/25 0927           [facility]   Vaginal " Delivery Note    Patient Name: Eve Paulino  : 2005  MRN: [id number]    Date of Delivery: 2025    Diagnosis     Pre & Post-Delivery:  Intrauterine pregnancy at 39w2d  Labor status: Induced Onset of Labor    39 weeks gestation of pregnancy             Problem List    Transfer to Postpartum     Review the Delivery Report for details.     Delivery     Delivery:      YOB: 2027   Time of Birth:  Gestational Age 9:16 AM  39w2d     Anesthesia: Epidural    Delivering clinician:     Forceps?   No   Vacuum? No    Shoulder dystocia present: No        Delivery narrative:  Patient admitted to L&D for IOL. Chung bulb placed for cervical ripening. Following removal IV Pitocin per protocol initiated. SROM occurred with return of clear fluid.Patient received epidural and was comfortable with contractions.    Labor progressed steadily. Patient felt increasingly more painful with contractions. Patient progressed to complete and felt the urge to push. Patient was placed in dorsal lithotomy position with feet in stirrups bilaterally. Patient was instructed on pushing in concert with contractions. Patient delivered infant vertex in NOLAN position. No nuchal cord was present. Anterior right shoulder delivered by gentle downward traction then followed by left posterior shoulder by gentle upward traction without complication. No shoulder dystocia. Body followed without difficulty. Male infant delivered pink, active, with good tone and was placed on maternal abdomen. Delayed cord clamping was performed for 1 minute while infant was stimulated and vigorous cry noted. Umbilical cord was doubly clamped and cut by father of the baby. Infant was attended to by labor and delivery and nursery nurse. Umbilical cord blood collected. Placenta delivered intact spontaneously without complication via gentle umbilical cord traction. IV Pitocin was started. Fundal massage was initiated and fundus was found to be firm.  "Blood and clots were cleared from the vaginal vault. The perineum, vaginal walls, cervix, and paraurethral area were inspected thoroughly. No lacerations observed. Hemostasis noted. No episiotomy was performed. Placenta was observed following delivery. Cotyledons all intact and membranes complete. Thick 3 vessel cord confirmed. No complications. Mother and baby were stable  when leaving delivery room.     I was present for all portions of delivery as listed above.         Infant     Findings: male infant     Infant observations: Weight: No birth weight on file.  Length:   in  Observations/Comments:        Apgars: 8  @ 1 minute /    9  @ 5 minutes   Infant Name: Curtis     Placenta & Cord         Placenta delivered  Spontaneous at   2/6/2025  9:20 AM    Cord:   present.   Nuchal Cord?  no   Cord blood obtained: Yes   Cord gases obtained:      Cord gas results: Venous:  No results found for: \"PHCVEN\", \"BECVEN\"    Arterial:  No results found for: \"PHCART\", \"BECART\"     Repair     Episiotomy: Not recorded    No    Lacerations: No   Estimated Blood Loss:  50mL     Quantitative Blood Loss:          Complications     none    Disposition     Mother to Mother Baby/Postpartum  in stable condition currently.  Baby to NBN  in stable condition currently.    Jeannine Betancourt DO  02/06/25  09:27 EST          Electronically signed by Jeannine Betancourt DO at 02/06/25 0929          Physician Progress Notes (last 5 days)        Jeannine Betancourt DO at 02/07/25 0846          [facility]  Obstetric Progress Note    Chief Complaint: PPD #1    Subjective     Eve doing well. Pain is well controlled. Reports light lochia without passage of major clots. Ambulating. Tolerating PO intake. Voiding without difficulty or dysuria. Reports flatus.   Pumping. Male infant doing well. Defers circ. Reports good mood.   No further complaints at this time.      Objective     Vital Signs Range for the last 24 hours  Temp:  [97.9 °F (36.6 °C)-99 °F " (37.2 °C)] 97.9 °F (36.6 °C)   BP: (0-133)/(0-70) 107/51   Heart Rate:  [] 84   Resp:  [16-18] 16                   Intake/Output last 24 hours:      Intake/Output Summary (Last 24 hours) at 2025 0846  Last data filed at 2025 0937  Gross per 24 hour   Intake --   Output 50 ml   Net -50 ml       Intake/Output this shift:    No intake/output data recorded.    Physical Exam:  General: A&Ox3. No acute distress.    HEENT Normocephalic, atraumatic    Heart RRR   Lungs CTAB, unlabored breathing   Abdomen Soft, non tender, negative for guarding and rebound   Extremities Exam of extremities: peripheral pulses normal, no pedal edema, no clubbing or cyanosis   Fundus Firm, midline, below umbilicus       Laboratory Results:  Lab Results   Component Value Date    WBC 11.51 (H) 2025    HGB 11.8 (L) 2025    HCT 38.2 2025    MCV 84.0 2025     2025    HEPBSAG Non-Reactive 2024         Assessment  PPD# 1 s/p vaginal delivery    Plan  Routine postpartum care.  VSS.  Serial lochia, fundal height checks appropriate. Continue serially.   PP Hgb ordered.  Pain well controled with tylenol and ibuprofen PRN.  Encourage PO hydration, ambulation, IS.  Male infant doing well.   Pumping. Lactation available for consult.     Dispo  Plan for discharge home tomorrow pending patient and infant status.        This note has been electronically signed.    Jeannine Betancourt DO  08:46 EST  2025          Electronically signed by Jeannine Betancourt DO at 25 0847       Yeison Ochoa DO at 25 1837          19 y.o.  at 39w   OB History          3    Para        Term                AB   2    Living             SAB   1    IAB   1    Ectopic        Molar        Multiple        Live Births                 Presents as an induction of labor due to rigid cervix and elective induction  Her primary OB requests a Chung Bulb placement to initiate the induction of  labor.    Fetal Heart Rate Assessment   Method:     Beats/min:     Baseline:     Varibility:     Accels:     Decels:     Tracing Category:       TOCO  SVE 1 cm / 50% -1 ultrasound confirmed vertex    A Chung Bulb was placed without difficulties with 60 mL of sterile saline.  The patient tolerated the procedure well.    Electronically signed by Yeison Ochoa DO at 25 1838          Discharge Summary        Parviz Arshad CNM at 25 0920       Attestation signed by Daphne Ann MD at 25 1627    I have reviewed this documentation and agree.                  [facility]  Vaginal delivery discharge summary      Patient: Eve Paulino      MR#:[id number]  Admission  Diagnosis: Present on Admission:  **None**      Discharge Diagnosis: Active and Resolved Problems  Active Hospital Problems    Diagnosis  POA     (spontaneous vaginal delivery) [O80]  Not Applicable      Resolved Hospital Problems    Diagnosis Date Resolved POA    **39 weeks gestation of pregnancy [Z3A.39] 2025 Not Applicable            Date of Admission: 2025  Date of Discharge:  2025    Procedures:  Vaginal, Spontaneous    2025   9:16 AM     Service:  Obstetrics    Hospital Course:  Patient underwent vaginal delivery and remained in the hospital for 3 days.  During that time she remained afebrile and hemodynamically stable.  On the day of discharge, she was eating, ambulating and voiding without difficulty.  She is pumping.     Labs:    Lab Results   Component Value Date    WBC 12.56 (H) 2025    HGB 10.3 (L) 2025    HCT 33.7 (L) 2025    MCV 86.6 2025     2025     Results from last 7 days   Lab Units 25  1591   ABO TYPING  A   RH TYPING  Positive   ANTIBODY SCREEN  Negative            Discharge Medications        New Medications        Instructions Start Date   docusate sodium 100 MG capsule   100 mg, Oral, 2 Times Daily PRN      ibuprofen 600 MG  tablet  Commonly known as: ADVIL,MOTRIN   600 mg, Oral, Every 6 Hours PRN             Continue These Medications        Instructions Start Date   Prenatal 27-1 27-1 MG tablet tablet   1 tablet, Daily             Stop These Medications      ferrous sulfate 324 (65 Fe) MG tablet delayed-release EC tablet              Discharge Disposition:  To Home    Discharge Condition:  Stable    Discharge Diet: Regular    Activity at Discharge: Pelvic rest    Follow-up Appointments  Follow up with LW in 6 weeks.     Parviz Arshad CNM  02/08/25  09:20 EST    Electronically signed by Daphne Ann MD at 02/08/25 0708

## 2025-02-20 ENCOUNTER — MATERNAL SCREENING (OUTPATIENT)
Dept: CALL CENTER | Facility: HOSPITAL | Age: 20
End: 2025-02-20
Payer: COMMERCIAL

## 2025-02-20 NOTE — OUTREACH NOTE
Maternal Screening Survey      Flowsheet Row Responses   Facility patient discharged from? [place]   Attempt successful? Yes   Call start time 1358   Call end time 1401   Person spoke with today (if not patient) and relationship Patient   I have been able to laugh and see the funny side of things. 0   I have looked forward with enjoyment to things. 0   I have blamed myself unnecessarily when things went wrong. 0   I have been anxious or worried for no good reason. 3   I have felt scared or panicky for no good reason. 0   Things have been getting on top of me. 1   I have been so unhappy that I have had difficulty sleeping. 0   I have felt sad or miserable. 1   I have been so unhappy that I have been crying. 1   The thought of harming myself has occurred to me. 0   [place]  Depression Scale Total 6   Did any of your parents have problems with alcohol or drug use? No   Do any of your peers have problems with alcohol or drug use? No   Does your partner have problems with alcohol or drug use? No   Before you were pregnant did you have problems with alcohol or drug use? (past) No   In the past month, did you drink beer, wine, liquor or use any other drugs? (pregnancy) No   Maternal Screening call completed Yes   Call end time 1401              Daena ESTES - Registered Nurse              Deana ESTES - Registered Nurse